# Patient Record
Sex: MALE | Employment: FULL TIME | URBAN - METROPOLITAN AREA
[De-identification: names, ages, dates, MRNs, and addresses within clinical notes are randomized per-mention and may not be internally consistent; named-entity substitution may affect disease eponyms.]

---

## 2021-11-30 ENCOUNTER — TELEPHONE (OUTPATIENT)
Dept: BEHAVIORAL HEALTH | Facility: CLINIC | Age: 46
End: 2021-11-30

## 2022-09-23 ENCOUNTER — TELEPHONE (OUTPATIENT)
Dept: FAMILY MEDICINE | Facility: OTHER | Age: 47
End: 2022-09-23

## 2022-09-23 NOTE — TELEPHONE ENCOUNTER
Please call patient and have him reschedule with a provider closer to him as he lives in [place] and wants to return to work/establish care.       HALEY Fuentes CNP  Questions or concerns please feel free to send me a [employer] message or call me  Phone : [phone]

## 2022-09-26 ENCOUNTER — MYC MEDICAL ADVICE (OUTPATIENT)
Dept: FAMILY MEDICINE | Facility: OTHER | Age: 47
End: 2022-09-26

## 2022-09-26 ENCOUNTER — VIRTUAL VISIT (OUTPATIENT)
Dept: FAMILY MEDICINE | Facility: OTHER | Age: 47
End: 2022-09-26
Payer: COMMERCIAL

## 2022-09-26 DIAGNOSIS — F10.21 ALCOHOL DEPENDENCE IN REMISSION (H): Primary | ICD-10-CM

## 2022-09-26 PROCEDURE — 99202 OFFICE O/P NEW SF 15 MIN: CPT | Mod: TEL | Performed by: NURSE PRACTITIONER

## 2022-09-26 RX ORDER — CITALOPRAM HYDROBROMIDE 40 MG/1
40 TABLET ORAL EVERY MORNING
COMMUNITY
Start: 2022-09-15

## 2022-09-26 NOTE — PROGRESS NOTES
Rigoberto is a 47 year old who is being evaluated via a billable video visit.      How would you like to obtain your AVS? MyChart  If the video visit is dropped, the invitation should be resent by: Text to cell phone: [phone]  Will anyone else be joining your video visit? No        Assessment & Plan     Alcohol dependence in remission (H)  Patient at [facility] (see dates below). Looking to get letter to return to work. Has outpatient plan and plan to establish care with a PCP in his area. Letter completed today and advised he continue with his sobriety.    The patient indicates understanding of these issues and agrees with the plan.      No follow-ups on file.    HALEY Fuentes [facility]   Rigoberto is a 47 year old, presenting for the following health issues:  No chief complaint on file.      HPI   Patient is needing a note to return to work. He was out for three weeks while in [place].   He will not be establishing care today, he will at a clinic closer to him. When he made the appointment, they told him he could do a phone visit to get the letter and then establish at a later date. He needs the letter now so he can go back to work    Patient was there from 09/02/2022- 09/22/2022.     Alcohol dependency program.   Continued to be sober.   No outpatient that would work for his schedule. Not doing IOP for him and doing meetings instead.         Review of Systems         Objective           Vitals:  No vitals were obtained today due to virtual visit.    Physical Exam   GENERAL: Healthy, alert and no distress  NEURO: Cranial nerves grossly intact.  Mentation and speech appropriate for age.  PSYCH: Mentation appears normal, affect normal/bright, judgement and insight intact, normal speech and appearance well-groomed.    No results found for any visits on 09/26/22.    Telephone visit 8 minutes

## 2022-09-26 NOTE — TELEPHONE ENCOUNTER
Reviewed.       HALEY Fuentes CNP  Questions or concerns please feel free to send me a [employer] message or call me  Phone : [phone]

## 2022-09-26 NOTE — LETTER
[address]  [facility] [address]  Phone: [phone]    September 26, 2022        Rigoberto CRESPO Gera  [address] [facility] [address]          To whom it may concern:    RE: Flacozelalem CHERIE Boss    Patient may return to work 09/26/22 with the following:  No working or lifting restrictions    Please contact me for questions or concerns.      Sincerely,        HALEY Fuentes CNP

## 2022-10-20 ENCOUNTER — TELEPHONE (OUTPATIENT)
Dept: NURSING | Facility: CLINIC | Age: 47
End: 2022-10-20

## 2022-10-20 ENCOUNTER — OFFICE VISIT (OUTPATIENT)
Dept: FAMILY MEDICINE | Facility: CLINIC | Age: 47
End: 2022-10-20
Payer: COMMERCIAL

## 2022-10-20 VITALS
RESPIRATION RATE: 16 BRPM | OXYGEN SATURATION: 97 % | HEIGHT: 71 IN | SYSTOLIC BLOOD PRESSURE: 126 MMHG | TEMPERATURE: 97.8 F | BODY MASS INDEX: 42.56 KG/M2 | HEART RATE: 100 BPM | DIASTOLIC BLOOD PRESSURE: 78 MMHG | WEIGHT: 304 LBS

## 2022-10-20 DIAGNOSIS — G89.29 CHRONIC LEFT-SIDED LOW BACK PAIN WITHOUT SCIATICA: ICD-10-CM

## 2022-10-20 DIAGNOSIS — G47.33 OBSTRUCTIVE SLEEP APNEA: ICD-10-CM

## 2022-10-20 DIAGNOSIS — Z00.00 ROUTINE GENERAL MEDICAL EXAMINATION AT A HEALTH CARE FACILITY: Primary | ICD-10-CM

## 2022-10-20 DIAGNOSIS — M54.50 CHRONIC LEFT-SIDED LOW BACK PAIN WITHOUT SCIATICA: ICD-10-CM

## 2022-10-20 DIAGNOSIS — E66.01 MORBID OBESITY (H): ICD-10-CM

## 2022-10-20 DIAGNOSIS — Z23 HIGH PRIORITY FOR 2019-NCOV VACCINE: ICD-10-CM

## 2022-10-20 DIAGNOSIS — E78.5 HYPERLIPIDEMIA, UNSPECIFIED HYPERLIPIDEMIA TYPE: ICD-10-CM

## 2022-10-20 PROBLEM — D3A.012: Status: ACTIVE | Noted: 2022-10-20

## 2022-10-20 LAB
ALBUMIN SERPL-MCNC: 3.9 G/DL (ref 3.4–5)
ALP SERPL-CCNC: 83 U/L (ref 40–150)
ALT SERPL W P-5'-P-CCNC: 35 U/L (ref 0–70)
ANION GAP SERPL CALCULATED.3IONS-SCNC: 8 MMOL/L (ref 3–14)
AST SERPL W P-5'-P-CCNC: 26 U/L (ref 0–45)
BASOPHILS # BLD AUTO: 0 10E3/UL (ref 0–0.2)
BASOPHILS NFR BLD AUTO: 1 %
BILIRUB SERPL-MCNC: 0.4 MG/DL (ref 0.2–1.3)
BUN SERPL-MCNC: 13 MG/DL (ref 7–30)
CALCIUM SERPL-MCNC: 9.8 MG/DL (ref 8.5–10.1)
CHLORIDE BLD-SCNC: 102 MMOL/L (ref 94–109)
CHOLEST SERPL-MCNC: 222 MG/DL
CO2 SERPL-SCNC: 26 MMOL/L (ref 20–32)
CREAT SERPL-MCNC: 0.89 MG/DL (ref 0.66–1.25)
EOSINOPHIL # BLD AUTO: 0.2 10E3/UL (ref 0–0.7)
EOSINOPHIL NFR BLD AUTO: 3 %
ERYTHROCYTE [DISTWIDTH] IN BLOOD BY AUTOMATED COUNT: 13.2 % (ref 10–15)
FASTING STATUS PATIENT QL REPORTED: YES
GFR SERPL CREATININE-BSD FRML MDRD: >90 ML/MIN/1.73M2
GLUCOSE BLD-MCNC: 118 MG/DL (ref 70–99)
HCT VFR BLD AUTO: 41.9 % (ref 40–53)
HDLC SERPL-MCNC: 72 MG/DL
HGB BLD-MCNC: 14.4 G/DL (ref 13.3–17.7)
LDLC SERPL CALC-MCNC: 87 MG/DL
LYMPHOCYTES # BLD AUTO: 2 10E3/UL (ref 0.8–5.3)
LYMPHOCYTES NFR BLD AUTO: 27 %
MCH RBC QN AUTO: 31.5 PG (ref 26.5–33)
MCHC RBC AUTO-ENTMCNC: 34.4 G/DL (ref 31.5–36.5)
MCV RBC AUTO: 92 FL (ref 78–100)
MONOCYTES # BLD AUTO: 0.7 10E3/UL (ref 0–1.3)
MONOCYTES NFR BLD AUTO: 10 %
NEUTROPHILS # BLD AUTO: 4.3 10E3/UL (ref 1.6–8.3)
NEUTROPHILS NFR BLD AUTO: 60 %
NONHDLC SERPL-MCNC: 150 MG/DL
PLATELET # BLD AUTO: 239 10E3/UL (ref 150–450)
POTASSIUM BLD-SCNC: 4.4 MMOL/L (ref 3.4–5.3)
PROT SERPL-MCNC: 8 G/DL (ref 6.8–8.8)
RBC # BLD AUTO: 4.57 10E6/UL (ref 4.4–5.9)
SODIUM SERPL-SCNC: 136 MMOL/L (ref 133–144)
TRIGL SERPL-MCNC: 314 MG/DL
WBC # BLD AUTO: 7.3 10E3/UL (ref 4–11)

## 2022-10-20 PROCEDURE — 99213 OFFICE O/P EST LOW 20 MIN: CPT | Mod: 25 | Performed by: FAMILY MEDICINE

## 2022-10-20 PROCEDURE — 80053 COMPREHEN METABOLIC PANEL: CPT | Performed by: FAMILY MEDICINE

## 2022-10-20 PROCEDURE — 85025 COMPLETE CBC W/AUTO DIFF WBC: CPT | Performed by: FAMILY MEDICINE

## 2022-10-20 PROCEDURE — 0124A COVID-19,PF,PFIZER BOOSTER BIVALENT: CPT | Performed by: FAMILY MEDICINE

## 2022-10-20 PROCEDURE — 90471 IMMUNIZATION ADMIN: CPT | Performed by: FAMILY MEDICINE

## 2022-10-20 PROCEDURE — 80061 LIPID PANEL: CPT | Performed by: FAMILY MEDICINE

## 2022-10-20 PROCEDURE — 91312 COVID-19,PF,PFIZER BOOSTER BIVALENT: CPT | Performed by: FAMILY MEDICINE

## 2022-10-20 PROCEDURE — 36415 COLL VENOUS BLD VENIPUNCTURE: CPT | Performed by: FAMILY MEDICINE

## 2022-10-20 PROCEDURE — 90686 IIV4 VACC NO PRSV 0.5 ML IM: CPT | Performed by: FAMILY MEDICINE

## 2022-10-20 PROCEDURE — 99396 PREV VISIT EST AGE 40-64: CPT | Mod: 25 | Performed by: FAMILY MEDICINE

## 2022-10-20 RX ORDER — CYCLOBENZAPRINE HCL 5 MG
5 TABLET ORAL
COMMUNITY
Start: 2022-09-28 | End: 2022-10-20

## 2022-10-20 RX ORDER — HYDROCODONE BITARTRATE AND ACETAMINOPHEN 5; 325 MG/1; MG/1
TABLET ORAL
Qty: 7 TABLET | Refills: 0 | Status: SHIPPED | OUTPATIENT
Start: 2022-10-20 | End: 2022-10-20

## 2022-10-20 RX ORDER — HYDROCODONE BITARTRATE AND ACETAMINOPHEN 5; 325 MG/1; MG/1
TABLET ORAL
Qty: 10 TABLET | Refills: 0 | Status: SHIPPED | OUTPATIENT
Start: 2022-10-20 | End: 2022-10-20

## 2022-10-20 RX ORDER — ALPRAZOLAM 0.5 MG
0.5 TABLET ORAL PRN
COMMUNITY
Start: 2022-10-19 | End: 2024-07-01 | Stop reason: ALTCHOICE

## 2022-10-20 RX ORDER — HYDROCODONE BITARTRATE AND ACETAMINOPHEN 5; 325 MG/1; MG/1
TABLET ORAL
Qty: 7 TABLET | Refills: 0 | Status: SHIPPED | OUTPATIENT
Start: 2022-10-20 | End: 2023-11-15

## 2022-10-20 ASSESSMENT — ENCOUNTER SYMPTOMS
WEAKNESS: 0
CONSTIPATION: 0
COUGH: 0
PARESTHESIAS: 0
ABDOMINAL PAIN: 1
DIARRHEA: 1
FEVER: 0
MYALGIAS: 1
ARTHRALGIAS: 1
HEADACHES: 0
HEMATURIA: 0
ALLERGIC/IMMUNOLOGIC NEGATIVE: 1
ENDOCRINE NEGATIVE: 1
NERVOUS/ANXIOUS: 1
CHILLS: 0
HEMATOLOGIC/LYMPHATIC NEGATIVE: 1
DYSURIA: 0
SORE THROAT: 0
SHORTNESS OF BREATH: 0
JOINT SWELLING: 0
EYE PAIN: 0
HEMATOCHEZIA: 0
DIZZINESS: 0
HEARTBURN: 1
PALPITATIONS: 0
FREQUENCY: 0
NAUSEA: 0

## 2022-10-20 ASSESSMENT — PAIN SCALES - GENERAL: PAINLEVEL: SEVERE PAIN (6)

## 2022-10-20 NOTE — PROGRESS NOTES
SUBJECTIVE:   CC: Rigoberto is an 47 year old who presents for preventative health visit.   Patient comes in for an annual exam.  History of obstructive sleep apnea he is on a CPAP machine.  Previous history of alcohol use he is fully in remission for over 2 months now.  Chronic low back pain he does get flare up a few times a years, left lower lumbar area.  Has no lower extremity numbness, weakness, lack of sensations.    Patient reports in 2003,hashis appendix removed, at that time, they removed a benign carcinoid tumor.    He has no symptoms of pain, no nausea, no change in his bowel habit, no family history of cancer or colon cancer.    Patient has been advised of split billing requirements and indicates understanding: Yes  Healthy Habits:     Getting at least 3 servings of Calcium per day:  Yes    Bi-annual eye exam:  NO    Dental care twice a year:  Yes    Sleep apnea or symptoms of sleep apnea:  Sleep apnea    Diet:  Regular (no restrictions)    Frequency of exercise:  1 day/week    Duration of exercise:  Greater than 60 minutes    Taking medications regularly:  Yes    Barriers to taking medications:  None    Medication side effects:  None    PHQ-2 Total Score: 0    Additional concerns today:  No      Today's PHQ-2 Score:   PHQ-2 ( 1999 [employer]) 10/20/2022   Q1: Little interest or pleasure in doing things 0   Q2: Feeling down, depressed or hopeless 0   PHQ-2 Score 0   Q1: Little interest or pleasure in doing things Not at all   Q2: Feeling down, depressed or hopeless Not at all   PHQ-2 Score 0       Abuse: Current or Past(Physical, Sexual or Emotional)- No  Do you feel safe in your environment? Yes    Have you ever done Advance Care Planning? (For example, a Health Directive, POLST, or a discussion with a medical provider or your loved ones about your wishes): No, advance care planning information given to patient to review.  Patient declined advance care planning discussion at this time.    Social History      Tobacco Use     Smoking status: Never     Smokeless tobacco: Never   Substance Use Topics     Alcohol use: Not Currently     If you drink alcohol do you typically have >3 drinks per day or >7 drinks per week? No    Alcohol Use 10/20/2022   Prescreen: >3 drinks/day or >7 drinks/week? Not Applicable   No flowsheet data found.    Last PSA: No results found for: PSA    Reviewed orders with patient. Reviewed health maintenance and updated orders accordingly - Yes  Labs reviewed in EPIC  BP Readings from Last 3 Encounters:   10/20/22 126/78    Wt Readings from Last 3 Encounters:   10/20/22 137.9 kg (304 lb)                  Patient Active Problem List   Diagnosis     Anxiety state     Hyperlipidemia     Lumbago     Obesity     LOREZNA on CPAP     Morbid obesity (H)     Benign carcinoid tumor of ileum     Past Surgical History:   Procedure Laterality Date     APPENDECTOMY  2003       Social History     Tobacco Use     Smoking status: Never     Smokeless tobacco: Never   Substance Use Topics     Alcohol use: Not Currently     History reviewed. No pertinent family history.        Reviewed and updated as needed this visit by clinical staff   Tobacco  Allergies  Meds  Problems  Med Hx  Surg Hx  Fam Hx  Soc   Hx        Reviewed and updated as needed this visit by Provider      Problems            Past Medical History:   Diagnosis Date     Benign carcinoid tumor of ileum 2003    s/p resection     Morbid obesity (H)      LORENZA on CPAP       Past Surgical History:   Procedure Laterality Date     APPENDECTOMY  2003     OB History   No obstetric history on file.       Review of Systems   Constitutional: Negative for chills and fever.   HENT: Negative for congestion, ear pain, hearing loss and sore throat.    Eyes: Negative for pain and visual disturbance.   Respiratory: Negative for cough and shortness of breath.    Cardiovascular: Negative for chest pain, palpitations and peripheral edema.   Gastrointestinal: Positive for  "abdominal pain, diarrhea and heartburn. Negative for constipation, hematochezia and nausea.   Endocrine: Negative.    Genitourinary: Negative for dysuria, frequency, genital sores, hematuria and urgency.   Musculoskeletal: Positive for arthralgias and myalgias. Negative for joint swelling.   Skin: Negative for rash.   Allergic/Immunologic: Negative.    Neurological: Negative for dizziness, weakness, headaches and paresthesias.   Hematological: Negative.    Psychiatric/Behavioral: Negative for mood changes. The patient is nervous/anxious.      CONSTITUTIONAL: NEGATIVE for fever, chills, change in weight  INTEGUMENTARY/SKIN: NEGATIVE for worrisome rashes, moles or lesions  EYES: NEGATIVE for vision changes or irritation  ENT: NEGATIVE for ear, mouth and throat problems  RESP: NEGATIVE for significant cough or SOB  CV: NEGATIVE for chest pain, palpitations or peripheral edema  GI: NEGATIVE for nausea, abdominal pain, heartburn, or change in bowel habits   male: negative for dysuria, hematuria, decreased urinary stream, erectile dysfunction, urethral discharge  MUSCULOSKELETAL: NEGATIVE for significant arthralgias or myalgia  NEURO: NEGATIVE for weakness, dizziness or paresthesias  ENDOCRINE: NEGATIVE for temperature intolerance, skin/hair changes  HEME/ALLERGY/IMMUNE: NEGATIVE for bleeding problems  PSYCHIATRIC: NEGATIVE for changes in mood or affect    OBJECTIVE:   /78 (BP Location: Right arm, Patient Position: Chair, Cuff Size: Adult Large)   Pulse 100   Temp 97.8  F (36.6  C) (Tympanic)   Resp 16   Ht 1.803 m (5' 11\")   Wt 137.9 kg (304 lb)   SpO2 97%   BMI 42.40 kg/m      Physical Exam  GENERAL: healthy, alert and no distress  EYES: Eyes grossly normal to inspection, PERRL and conjunctivae and sclerae normal  HENT: ear canals and TM's normal, nose and mouth without ulcers or lesions  NECK: no adenopathy, no asymmetry, masses, or scars and thyroid normal to palpation  RESP: lungs clear to auscultation " - no rales, rhonchi or wheezes  CV: regular rate and rhythm, normal S1 S2, no S3 or S4, no murmur, click or rub, no peripheral edema and peripheral pulses strong  ABDOMEN: soft, nontender, no hepatosplenomegaly, no masses and bowel sounds normal  MS: no gross musculoskeletal defects noted, no edema  Back exam: mild tenderness, left lower paraspinal lumbar region  Full range of motion, full flexion no pain  Mild stiffness with extension  Lower ext strength intact.  Negative for SLR bilaterally.  SKIN: no suspicious lesions or rashes  NEURO: Normal strength and tone, mentation intact and speech normal  PSYCH: mentation appears normal, affect normal/bright    Diagnostic Test Results:  Labs reviewed in Epic  Orders Placed This Encounter   Procedures     REVIEW OF HEALTH MAINTENANCE PROTOCOL ORDERS     INFLUENZA VACCINE IM > 6 MONTHS VALENT IIV4 (AFLURIA/FLUZONE)     COVID-19,PF,PFIZER BOOSTER BIVALENT 12+Yrs     Lipid panel reflex to direct LDL Non-fasting     Comprehensive metabolic panel (BMP + Alb, Alk Phos, ALT, AST, Total. Bili, TP)     CBC with platelets and differential     Colonoscopy Screening  Referral     CBC with platelets and differential       ASSESSMENT/PLAN:   (Z00.00) Routine general medical examination at a health care facility  (primary encounter diagnosis)  Comment: normal exam  Plan: Colonoscopy Screening  Referral, Lipid        panel reflex to direct LDL Non-fasting,         Comprehensive metabolic panel (BMP + Alb, Alk         Phos, ALT, AST, Total. Bili, TP), CBC with         platelets and differential        Routine preventive care discussed.  Advised with diet, exercise, increase physical activity.  Updated his immunizations.  one year annual exam follow-up recommended    (G47.33) Obstructive sleep apnea, CPAP  Comment:   Plan: on CPAP, doing well    (E66.01) Morbid obesity (H)  Comment:   Plan: Discussed diet, exercise, increase physical activity.  Advised patient to avoid  "late meals, try to walk more frequent.  Discussed with patient morbid obesity is a risk for increase diabetes, increased risk for hypertension, increased risk for obstructive sleep apnea, heart disease.    (E78.5) Hyperlipidemia, unspecified hyperlipidemia type  Comment:   Plan: Lipid panel today    (Z23) High priority for 2019-nCoV vaccine  Comment:   Plan: COVID-19,PF,PFIZER BOOSTER BIVALENT 12+Yrs            (M54.50,  G89.29) Chronic left-sided low back pain without sciatica  Comment:   Plan: HYDROcodone-acetaminophen (NORCO) 5-325 MG         tablet        Chronic: Patient to continue with home daily stretching and exercises.  Advised patient to remain active, advised patient strongly to lose weight.  Patient was given 10 tablets of hydrocodone, use as needed for flareup.  With the understanding, this should last for at  least 6 months.    He may use ibuprofen, or any other NSAID as needed.  Flexeril at bedtime as needed.      Patient has been advised of split billing requirements and indicates understanding: Yes      COUNSELING:   Reviewed preventive health counseling, as reflected in patient instructions       Regular exercise       Healthy diet/nutrition       Immunizations    Vaccinated for: Influenza, and COVID booster      Estimated body mass index is 42.4 kg/m  as calculated from the following:    Height as of this encounter: 1.803 m (5' 11\").    Weight as of this encounter: 137.9 kg (304 lb).     Weight management plan: Discussed healthy diet and exercise guidelines    He reports that he has never smoked. He has never used smokeless tobacco.      Counseling Resources:  ATP IV Guidelines  Pooled Cohorts Equation Calculator  FRAX Risk Assessment  ICSI Preventive Guidelines  Dietary Guidelines for Americans, 2010  USDA's MyPlate  ASA Prophylaxis  Lung CA Screening    Madhav Johnson MD  [facility]  "

## 2022-10-20 NOTE — TELEPHONE ENCOUNTER
Routing to PCP.    Rolando, Pharmacist, calling regarding new RX that was sent for Norco.      In order for this RX to be filled it needs to be one of the two options:    1.  Same RX but for CHRONIC back pain.  Law indicates only a 7 day supply can be given for acute back pain. RX to be resent for chronic back pain instead of back pain.    2.  New RX with sig which reads: 1-2 tabs every 6 hours as needed for pain (as this is a 7 day supply).  May dispense more if needed but no more than 7 day supply.    Please resend RX for Norco with one of the two options.    Rolando can be reached at [phone] if needed.      Mingo Villa RN

## 2022-10-20 NOTE — TELEPHONE ENCOUNTER
Bobby. Pharmacist from [facility] ( [phone]) calling about;    New RX for Norco    Can only fill for 7 days unless  Writes an explanation as to why needs 3 months.     Msg routed to Dr. Elizabeth Corey/Care Team to advise pharmacy     Christie Chong RN, Nurse Advisor 2:16 PM 10/20/2022

## 2023-11-15 ENCOUNTER — OFFICE VISIT (OUTPATIENT)
Dept: FAMILY MEDICINE | Facility: CLINIC | Age: 48
End: 2023-11-15
Payer: COMMERCIAL

## 2023-11-15 VITALS
OXYGEN SATURATION: 96 % | TEMPERATURE: 98.5 F | HEART RATE: 83 BPM | RESPIRATION RATE: 18 BRPM | HEIGHT: 71 IN | DIASTOLIC BLOOD PRESSURE: 85 MMHG | WEIGHT: 315 LBS | BODY MASS INDEX: 44.1 KG/M2 | SYSTOLIC BLOOD PRESSURE: 122 MMHG

## 2023-11-15 DIAGNOSIS — E66.01 MORBID OBESITY (H): ICD-10-CM

## 2023-11-15 DIAGNOSIS — G89.29 CHRONIC LEFT-SIDED LOW BACK PAIN WITHOUT SCIATICA: ICD-10-CM

## 2023-11-15 DIAGNOSIS — Z11.4 SCREENING FOR HIV (HUMAN IMMUNODEFICIENCY VIRUS): ICD-10-CM

## 2023-11-15 DIAGNOSIS — Z00.00 ROUTINE GENERAL MEDICAL EXAMINATION AT A HEALTH CARE FACILITY: Primary | ICD-10-CM

## 2023-11-15 DIAGNOSIS — E78.5 HYPERLIPIDEMIA, UNSPECIFIED HYPERLIPIDEMIA TYPE: ICD-10-CM

## 2023-11-15 DIAGNOSIS — Z13.1 SCREENING FOR DIABETES MELLITUS: ICD-10-CM

## 2023-11-15 DIAGNOSIS — G47.33 OSA ON CPAP: ICD-10-CM

## 2023-11-15 DIAGNOSIS — M54.50 CHRONIC LEFT-SIDED LOW BACK PAIN WITHOUT SCIATICA: ICD-10-CM

## 2023-11-15 DIAGNOSIS — Z12.11 SCREEN FOR COLON CANCER: ICD-10-CM

## 2023-11-15 DIAGNOSIS — Z11.59 NEED FOR HEPATITIS C SCREENING TEST: ICD-10-CM

## 2023-11-15 LAB
CHOLEST SERPL-MCNC: 239 MG/DL
CREAT UR-MCNC: 82 MG/DL
FASTING STATUS PATIENT QL REPORTED: YES
GLUCOSE SERPL-MCNC: 96 MG/DL (ref 70–99)
HBA1C MFR BLD: 5.3 % (ref 0–5.6)
HDLC SERPL-MCNC: 82 MG/DL
LDLC SERPL CALC-MCNC: 130 MG/DL
NONHDLC SERPL-MCNC: 157 MG/DL
TRIGL SERPL-MCNC: 136 MG/DL

## 2023-11-15 PROCEDURE — 91320 SARSCV2 VAC 30MCG TRS-SUC IM: CPT | Performed by: PHYSICIAN ASSISTANT

## 2023-11-15 PROCEDURE — 90471 IMMUNIZATION ADMIN: CPT | Performed by: PHYSICIAN ASSISTANT

## 2023-11-15 PROCEDURE — 83036 HEMOGLOBIN GLYCOSYLATED A1C: CPT | Performed by: PHYSICIAN ASSISTANT

## 2023-11-15 PROCEDURE — 36415 COLL VENOUS BLD VENIPUNCTURE: CPT | Performed by: PHYSICIAN ASSISTANT

## 2023-11-15 PROCEDURE — 99214 OFFICE O/P EST MOD 30 MIN: CPT | Mod: 25 | Performed by: PHYSICIAN ASSISTANT

## 2023-11-15 PROCEDURE — 90472 IMMUNIZATION ADMIN EACH ADD: CPT | Performed by: PHYSICIAN ASSISTANT

## 2023-11-15 PROCEDURE — 82947 ASSAY GLUCOSE BLOOD QUANT: CPT | Performed by: PHYSICIAN ASSISTANT

## 2023-11-15 PROCEDURE — 99396 PREV VISIT EST AGE 40-64: CPT | Mod: 25 | Performed by: PHYSICIAN ASSISTANT

## 2023-11-15 PROCEDURE — 90480 ADMN SARSCOV2 VAC 1/ONLY CMP: CPT | Performed by: PHYSICIAN ASSISTANT

## 2023-11-15 PROCEDURE — 86803 HEPATITIS C AB TEST: CPT | Performed by: PHYSICIAN ASSISTANT

## 2023-11-15 PROCEDURE — 90715 TDAP VACCINE 7 YRS/> IM: CPT | Performed by: PHYSICIAN ASSISTANT

## 2023-11-15 PROCEDURE — 87389 HIV-1 AG W/HIV-1&-2 AB AG IA: CPT | Performed by: PHYSICIAN ASSISTANT

## 2023-11-15 PROCEDURE — 90686 IIV4 VACC NO PRSV 0.5 ML IM: CPT | Performed by: PHYSICIAN ASSISTANT

## 2023-11-15 PROCEDURE — G0480 DRUG TEST DEF 1-7 CLASSES: HCPCS | Performed by: PHYSICIAN ASSISTANT

## 2023-11-15 PROCEDURE — 80061 LIPID PANEL: CPT | Performed by: PHYSICIAN ASSISTANT

## 2023-11-15 RX ORDER — BUSPIRONE HYDROCHLORIDE 10 MG/1
TABLET ORAL
COMMUNITY
Start: 2023-04-18 | End: 2023-11-15 | Stop reason: ALTCHOICE

## 2023-11-15 RX ORDER — HYDROCODONE BITARTRATE AND ACETAMINOPHEN 5; 325 MG/1; MG/1
TABLET ORAL
Qty: 10 TABLET | Refills: 0 | Status: SHIPPED | OUTPATIENT
Start: 2023-11-15 | End: 2023-12-22

## 2023-11-15 RX ORDER — GABAPENTIN 400 MG/1
400 CAPSULE ORAL PRN
COMMUNITY

## 2023-11-15 ASSESSMENT — ENCOUNTER SYMPTOMS
DYSURIA: 0
FREQUENCY: 0
COUGH: 0
SHORTNESS OF BREATH: 0
NAUSEA: 1
HEADACHES: 1
FEVER: 0
WEAKNESS: 1
CHILLS: 0
MYALGIAS: 1
SORE THROAT: 0
ABDOMINAL PAIN: 0
NERVOUS/ANXIOUS: 1
DIZZINESS: 0
PARESTHESIAS: 0
HEARTBURN: 1
EYE PAIN: 0
HEMATOCHEZIA: 0
ARTHRALGIAS: 1
PALPITATIONS: 0
HEMATURIA: 0
DIARRHEA: 0
CONSTIPATION: 0
JOINT SWELLING: 0

## 2023-11-15 ASSESSMENT — PAIN SCALES - GENERAL: PAINLEVEL: EXTREME PAIN (8)

## 2023-11-15 NOTE — NURSING NOTE
Prior to immunization administration, verified patients identity using patient s name and date of birth. Please see Immunization Activity for additional information.     Screening Questionnaire for Adult Immunization    Are you sick today?   No   Do you have allergies to medications, food, a vaccine component or latex?   No   Have you ever had a serious reaction after receiving a vaccination?   No   Do you have a long-term health problem with heart, lung, kidney, or metabolic disease (e.g., diabetes), asthma, a blood disorder, no spleen, complement component deficiency, a cochlear implant, or a spinal fluid leak?  Are you on long-term aspirin therapy?   No   Do you have cancer, leukemia, HIV/AIDS, or any other immune system problem?   No   Do you have a parent, brother, or sister with an immune system problem?   No   In the past 3 months, have you taken medications that affect  your immune system, such as prednisone, other steroids, or anticancer drugs; drugs for the treatment of rheumatoid arthritis, Crohn s disease, or psoriasis; or have you had radiation treatments?   No   Have you had a seizure, or a brain or other nervous system problem?   No   During the past year, have you received a transfusion of blood or blood    products, or been given immune (gamma) globulin or antiviral drug?   No   For women: Are you pregnant or is there a chance you could become       pregnant during the next month?   No   Have you received any vaccinations in the past 4 weeks?   No     Immunization questionnaire answers were all negative.      Patient instructed to remain in clinic for 15 minutes afterwards, and to report any adverse reactions.     Screening performed by Fletcher Pradhan on 11/15/2023 at 10:23 AM.

## 2023-11-15 NOTE — PROGRESS NOTES
SUBJECTIVE:   Rigoberto is a 48 year old, presenting for the following:  Physical      Healthy Habits:     Getting at least 3 servings of Calcium per day:  Yes    Bi-annual eye exam:  NO    Dental care twice a year:  NO    Sleep apnea or symptoms of sleep apnea:  Sleep apnea    Diet:  Other    Frequency of exercise:  None    Taking medications regularly:  Yes    Medication side effects:  None    Additional concerns today:  Yes    Additional provider notes :   Chronic back pain - on and off over the last few years. It has been worsening over the last 2 months. Hard to exercise and lose weight because of the pain. Sometimes hard to just do is ADL's specially when it flares up. He has tried pain medication and stretching. He wants to see a specialist for better management           Social History     Tobacco Use    Smoking status: Never     Passive exposure: Never    Smokeless tobacco: Never   Substance Use Topics    Alcohol use: Yes             11/15/2023     9:33 AM   Alcohol Use   Prescreen: >3 drinks/day or >7 drinks/week? Yes   AUDIT SCORE  21         11/15/2023     9:33 AM   AUDIT - Alcohol Use Disorders Identification Test - Reproduced from the World Health Organization Audit 2001 (Second Edition)   1.  How often do you have a drink containing alcohol? 4 or more times a week   2.  How many drinks containing alcohol do you have on a typical day when you are drinking? 3 or 4   3.  How often do you have five or more drinks on one occasion? Weekly   4.  How often during the last year have you found that you were not able to stop drinking once you had started? Weekly   5.  How often during the last year have you failed to do what was normally expected of you because of drinking? Weekly   6.  How often during the last year have you needed a first drink in the morning to get yourself going after a heavy drinking session? Never   7.  How often during the last year have you had a feeling of guilt or remorse after  "drinking? Weekly   8.  How often during the last year have you been unable to remember what happened the night before because of your drinking? Never   9.  Have you or someone else been injured because of your drinking? No   10. Has a relative, friend, doctor or other health care worker been concerned about your drinking or suggested you cut down? Yes, during the last year   TOTAL SCORE 21       Last PSA: No results found for: \"PSA\"    Reviewed orders with patient. Reviewed health maintenance and updated orders accordingly - Yes  Lab work is in process  Labs reviewed in EPIC    Reviewed and updated as needed this visit by clinical staff   Tobacco  Allergies  Meds      Soc Hx        Reviewed and updated as needed this visit by Provider                   Review of Systems   Constitutional:  Negative for chills and fever.   HENT:  Negative for congestion, ear pain, hearing loss and sore throat.    Eyes:  Negative for pain and visual disturbance.   Respiratory:  Negative for cough and shortness of breath.    Cardiovascular:  Negative for chest pain, palpitations and peripheral edema.   Gastrointestinal:  Positive for heartburn and nausea. Negative for abdominal pain, constipation, diarrhea and hematochezia.   Genitourinary:  Negative for dysuria, frequency, genital sores, hematuria and urgency.   Musculoskeletal:  Positive for arthralgias and myalgias. Negative for joint swelling.   Skin:  Negative for rash.   Neurological:  Positive for weakness and headaches. Negative for dizziness and paresthesias.   Psychiatric/Behavioral:  Negative for mood changes. The patient is nervous/anxious.          OBJECTIVE:   Temp 98.5  F (36.9  C) (Oral)   Resp 18   Ht 1.795 m (5' 10.67\")   Wt 147.1 kg (324 lb 6.4 oz)   BMI 45.67 kg/m      Physical Exam  GENERAL: healthy, alert and no distress  EYES: Eyes grossly normal to inspection, PERRL and conjunctivae and sclerae normal  HENT: ear canals and TM's normal, nose and mouth " without ulcers or lesions  NECK: no adenopathy, no asymmetry, masses, or scars and thyroid normal to palpation  RESP: lungs clear to auscultation - no rales, rhonchi or wheezes  CV: regular rate and rhythm, normal S1 S2, no S3 or S4, no murmur, click or rub, no peripheral edema and peripheral pulses strong  ABDOMEN: soft, nontender, no hepatosplenomegaly, no masses and bowel sounds normal  MS: spine exam shows mild tenderness to bilateral lumbar paraspinal muscles, negative straight leg test. Normal strength, decrease ROM on flexion and extension  SKIN: no suspicious lesions or rashes  NEURO: Normal strength and tone, mentation intact and speech normal  PSYCH: mentation appears normal, affect normal/bright    Diagnostic Test Results:  Labs reviewed in Epic    ASSESSMENT/PLAN:   (Z00.00) Routine general medical examination at a health care facility  (primary encounter diagnosis)  Comment: repeat 1 year     (Z12.11) Screen for colon cancer  Plan: Colonoscopy Screening  Referral            (Z11.4) Screening for HIV (human immunodeficiency virus)  Plan: HIV Antigen Antibody Combo            (Z11.59) Need for hepatitis C screening test  Plan: Hepatitis C Screen Reflex to HCV RNA Quant and         Genotype            (M54.50,  G89.29) Chronic left-sided low back pain without sciatica  Comment: Chronic, worsening over the last 2 months. Discussed physical therapy but patient declined. He would prefer to see Specialist. I am okay with this plan. I recommend MRI prior to seeing specialist as they're going to want this to review with patient. Refill on a few Shepherd as needed for severe pain. This should last 6 months until okay for next refill  Plan: [id number] - Urine Drug Confirmation Panel         (Comprehensive), MR Lumbar Spine w/o Contrast,         Orthopedic  Referral,         HYDROcodone-acetaminophen (NORCO) 5-325 MG         tablet            (G47.33) LORENZA on CPAP  Comment: Stable    (E78.5)  Hyperlipidemia, unspecified hyperlipidemia type  Comment: Discussed diet and exercise with patient today to reduce cholesterol. Rechecking labs. May need to consider med managment   Plan: Lipid panel reflex to direct LDL Fasting            (Z13.1) Screening for diabetes mellitus  Plan: Hemoglobin A1c, Glucose            (E66.01) Morbid obesity (H)  Comment: Diet and exercise discussed    Patient has been advised of split billing requirements and indicates understanding: Yes      COUNSELING:   Reviewed preventive health counseling, as reflected in patient instructions       Regular exercise       Healthy diet/nutrition       Consider Hep C screening for all patients one time for ages 18-79 years       Colorectal cancer screening        He reports that he has never smoked. He has never been exposed to tobacco smoke. He has never used smokeless tobacco.            LARON Tapia  [facility]

## 2023-11-16 LAB
HCV AB SERPL QL IA: NONREACTIVE
HIV 1+2 AB+HIV1 P24 AG SERPL QL IA: NONREACTIVE

## 2023-11-17 LAB
A-OH ALPRAZ UR QL CFM: PRESENT
ALPRAZ UR QL CFM: PRESENT
GABAPENTIN UR QL CFM: PRESENT
LORAZEPAM UR QL CFM: PRESENT

## 2023-12-07 ENCOUNTER — TELEPHONE (OUTPATIENT)
Dept: GASTROENTEROLOGY | Facility: CLINIC | Age: 48
End: 2023-12-07
Payer: COMMERCIAL

## 2023-12-07 NOTE — TELEPHONE ENCOUNTER
"Endoscopy Scheduling Screen    Have you had a positive Covid test in the last 14 days?  No    Are you active on MyChart?   Yes    What insurance is in the chart?  Other:  [facility]    Ordering/Referring Provider:     YVONNE CARTER      (If ordering provider performs procedure, schedule with ordering provider unless otherwise instructed. )    BMI: Estimated body mass index is 45.67 kg/m  as calculated from the following:    Height as of 11/15/23: 1.795 m (5' 10.67\").    Weight as of 11/15/23: 147.1 kg (324 lb 6.4 oz).     Sedation Ordered  moderate sedation.   If patient BMI > 50 do not schedule in [facility].    If patient BMI > 45 do not schedule at [facility].    Are you taking methadone or Suboxone?  No    Are you taking any prescription medications for pain 3 or more times per week?   No    Do you have a history of malignant hyperthermia or adverse reaction to anesthesia?  No    (Females) Are you currently pregnant?        Have you been diagnosed or told you have pulmonary hypertension?   No    Do you have an LVAD?  No    Have you been told you have moderate to severe sleep apnea?  Yes (RN Review required for scheduling unless scheduling in Hospital.)    Have you been told you have COPD, asthma, or any other lung disease?  No    Do you have any heart conditions?  No     Have you ever had an organ transplant?   No    Have you ever had or are you awaiting a heart or lung transplant?   No    Have you had a stroke or transient ischemic attack (TIA aka \"mini stroke\" in the last 6 months?   No    Have you been diagnosed with or been told you have cirrhosis of the liver?   No    Are you currently on dialysis?   No    Do you need assistance transferring?   No    BMI: Estimated body mass index is 45.67 kg/m  as calculated from the following:    Height as of 11/15/23: 1.795 m (5' 10.67\").    Weight as of 11/15/23: 147.1 kg (324 lb 6.4 oz).     Is patients BMI > 40 and scheduling location [facility]?  Yes (If MAC sedation is ordered, schedule " PAC eval)    Do you take an injectable medication for weight loss or diabetes (excluding insulin)?  No    Do you take the medication Naltrexone?  No    Do you take blood thinners?  No       Prep   Are you currently on dialysis or do you have chronic kidney disease?  No    Do you have a diagnosis of diabetes?  No    Do you have a diagnosis of cystic fibrosis (CF)?  No    On a regular basis do you go 3 -5 days between bowel movements?  No    BMI > 40?  Yes (Extended Prep)    Preferred Pharmacy:    [facility]/pharmacy #[id number] - [facility] [address] AT CORNER OF [address] [place], [place] [address] [facility] [address]  Phone: [phone] Fax: [phone]      Final Scheduling Details   Colonoscopy prep sent?  Golytely Extended Prep    Procedure scheduled  Colonoscopy    Surgeon:  NIKIA     Date of procedure:  3/1     Pre-OP / PAC:   Yes - PAC clinic evaluation scheduled.    Location  [facility] - Per order.    Sedation   Moderate Sedation - Per order.      Patient Reminders:   You will receive a call from a Nurse to review instructions and health history.  This assessment must be completed prior to your procedure.  Failure to complete the Nurse assessment may result in the procedure being cancelled.      On the day of your procedure, please designate an adult(s) who can drive you home stay with you for the next 24 hours. The medicines used in the exam will make you sleepy. You will not be able to drive.      You cannot take public transportation, ride share services, or non-medical taxi service without a responsible caregiver.  Medical transport services are allowed with the requirement that a responsible caregiver will receive you at your destination.  We require that drivers and caregivers are confirmed prior to your procedure.

## 2023-12-08 NOTE — TELEPHONE ENCOUNTER
FUTURE VISIT INFORMATION      SURGERY INFORMATION:  Date: 3/1/24  Location:  [facility]  Surgeon:  Frederick Sanchez MD   Anesthesia Type:    Moderate Sedation     Procedure: Colonoscopy   RECORDS REQUESTED FROM:       Primary Care Provider: [facility]    Pertinent Medical History: LORENZA

## 2023-12-17 ENCOUNTER — MYC REFILL (OUTPATIENT)
Dept: FAMILY MEDICINE | Facility: CLINIC | Age: 48
End: 2023-12-17
Payer: COMMERCIAL

## 2023-12-17 DIAGNOSIS — M54.50 CHRONIC LEFT-SIDED LOW BACK PAIN WITHOUT SCIATICA: ICD-10-CM

## 2023-12-17 DIAGNOSIS — G89.29 CHRONIC LEFT-SIDED LOW BACK PAIN WITHOUT SCIATICA: ICD-10-CM

## 2023-12-18 ENCOUNTER — TELEPHONE (OUTPATIENT)
Dept: FAMILY MEDICINE | Facility: CLINIC | Age: 48
End: 2023-12-18
Payer: COMMERCIAL

## 2023-12-18 DIAGNOSIS — M54.50 CHRONIC LEFT-SIDED LOW BACK PAIN WITHOUT SCIATICA: ICD-10-CM

## 2023-12-18 DIAGNOSIS — G89.29 CHRONIC LEFT-SIDED LOW BACK PAIN WITHOUT SCIATICA: ICD-10-CM

## 2023-12-18 RX ORDER — HYDROCODONE BITARTRATE AND ACETAMINOPHEN 5; 325 MG/1; MG/1
TABLET ORAL
Qty: 10 TABLET | Refills: 0 | OUTPATIENT
Start: 2023-12-18

## 2023-12-18 NOTE — TELEPHONE ENCOUNTER
"Pt calling    He was wondering if he could get a refil on his narcotic      He is exercising and swimming, which is going well.  He reports getting a new chair and that has caused his back to \"go out\"- this has been going on for a few days.  It is especially painful at night and causes him to not be able to sleep. He is using ibuprofen as well without relief. He has not scheduled with any specialist yet      11/15  (M54.50,  G89.29) Chronic left-sided low back pain without sciatica  Comment: Chronic, worsening over the last 2 months. Discussed physical therapy but patient declined. He would prefer to see Specialist. I am okay with this plan. I recommend MRI prior to seeing specialist as they're going to want this to review with patient. Refill on a few [place] as needed for severe pain. This should last 6 months until okay for next refill  Plan: [id number] - Urine Drug Confirmation Panel         (Comprehensive), MR Lumbar Spine w/o Contrast,         Orthopedic  Referral,         HYDROcodone-acetaminophen (NORCO) 5-325 MG     He was taking 1 per day when the pain got very bad.  He thought that this would last him 6 months but then he states hurt his back again.      Marcella, RN    Triage Nurse  [facility]      "

## 2023-12-18 NOTE — TELEPHONE ENCOUNTER
If he would like to continue with more narcotic use then he would need to be seen by a pain clinic to prescribe and monitor medication.

## 2023-12-18 NOTE — TELEPHONE ENCOUNTER
Patient returned call. Relayed message from Pamela Jacobs. Asked patient if he would like a referral from her to pain clinic. Patient declined stating that this won't help him right now because it will probably take some to get an appointment with them. Notified him that if he changes his mind about the referral to let us know    Janice Weldon RN   [facility]

## 2023-12-22 RX ORDER — METHOCARBAMOL 500 MG/1
500 TABLET, FILM COATED ORAL 3 TIMES DAILY PRN
Qty: 30 TABLET | Refills: 0 | Status: SHIPPED | OUTPATIENT
Start: 2023-12-22 | End: 2024-02-09

## 2023-12-22 RX ORDER — LIDOCAINE 4 G/G
1 PATCH TOPICAL EVERY 24 HOURS
Qty: 10 PATCH | Refills: 0 | Status: SHIPPED | OUTPATIENT
Start: 2023-12-22 | End: 2024-02-09

## 2023-12-22 RX ORDER — HYDROCODONE BITARTRATE AND ACETAMINOPHEN 5; 325 MG/1; MG/1
TABLET ORAL
Qty: 10 TABLET | Refills: 0 | Status: SHIPPED | OUTPATIENT
Start: 2023-12-22 | End: 2024-02-09

## 2023-12-22 NOTE — TELEPHONE ENCOUNTER
"Routing to Pamela and covering providers.     Patient calling     He is wanting narcotic med as his back pain is worsening    RN went over provider's note below. RN asked if he wanted pain clinic referral and patient said: \"only if I can get in today\"  RN said that the provider needs to order the referral and then they need to schedule him an appointment and that would not be today, but after the weekend.    Patient got really frustrated with RN, so RN advised ED or UC today if his back pain is severe. Patient said \"no I have holiday plans. just have Pamela call me\". RN advised that a note can be sent to Pamela and covering providers, but RN cannot guarantee that it will be addressed before the weekend. Patient was very frustrated with RN. RN asked if he needed anything else, and patient said no. Phone call ended    Patient came into clinic about 15 minutes later and asked for update on the narcotic med.   RN had not yet had the chance to send this encounter yet in that time.  let patient know that message will be sent, but there is no guarantee that this will get addressed this weekend.    Lucita Marinelli RN    "

## 2023-12-22 NOTE — TELEPHONE ENCOUNTER
Patient calling back to check status of request.  Reviewed provider's message to patient and also explained that ABNER Landrum already sent an urgent message to provider again with his request below.    He stated that he is not a drug seeker and uses the norco sparingly.  He would rather take something that he knows he can tolerate than to try something new.  He is asking if provider can send in a small supply, 10 tabs, to get him through the holiday  (Per note below, he is also needing the muscle relaxant and lidocaine patch)    Please call patient back once provider responds    Arjun Villavicencio RN  [facility]

## 2023-12-22 NOTE — TELEPHONE ENCOUNTER
I have sent in the lidocaine patch, muscle relaxant and Norco. If he wants any refills on his Norco before 6 months from now for his back pain he will need get it from pain clinic or spine specialist. I will not refill it again early  Thank you,  Pamela Ruth PA-C

## 2023-12-22 NOTE — TELEPHONE ENCOUNTER
Routing to Pamela LEVINE called patient     He would like the muscle relaxant and the lidocaine patch.    Patient is very persistent about getting norco and RN kept reiterating provider's message, but he wanted me to make sure that was included in message to provider.    Lucita Marinelli RN

## 2023-12-22 NOTE — TELEPHONE ENCOUNTER
I reviewed this telephone encounter with patient as well as my last visit. I understand his pain is worsening but I don't recommend any more narcotic use. I can however offer alternative pain medication such as Naproxen, muscle relaxant, or topical lidocaine patches? OTC alternating 400-600 mg of ibuprofen and 500 mg of Tylenol is also an option. Please let me know and I can send in something.     Thank you,  Pamela Ruth PA-C

## 2023-12-22 NOTE — TELEPHONE ENCOUNTER
Patient called back, and advised to UC, as there are not any openings today in the clinic.    MRI scheduled on 1/6/24, and spine is scheduled at 1/8/23.    He refuses UC, as he does not want to set there for 5 hours.    Advised Pamela LARRY is not in clinic today.    He stated that she would like a covering provider to address this.    Routed to provider, covering provider, and triage.    Ban TAMN RN  Triage Nurse  [facility]

## 2023-12-22 NOTE — TELEPHONE ENCOUNTER
RN called patient/family and relayed provider's message. Patient/family verbalized understanding.     Lucita Marinelli RN, BSN  [facility]

## 2024-01-06 ENCOUNTER — HOSPITAL ENCOUNTER (OUTPATIENT)
Dept: MRI IMAGING | Facility: HOSPITAL | Age: 49
Discharge: HOME | End: 2024-01-06
Attending: PHYSICIAN ASSISTANT | Admitting: PHYSICIAN ASSISTANT
Payer: COMMERCIAL

## 2024-01-06 DIAGNOSIS — M54.50 CHRONIC LEFT-SIDED LOW BACK PAIN WITHOUT SCIATICA: ICD-10-CM

## 2024-01-06 DIAGNOSIS — G89.29 CHRONIC LEFT-SIDED LOW BACK PAIN WITHOUT SCIATICA: ICD-10-CM

## 2024-01-06 PROCEDURE — 72148 MRI LUMBAR SPINE W/O DYE: CPT

## 2024-01-08 ENCOUNTER — OFFICE VISIT (OUTPATIENT)
Dept: PHYSICAL MEDICINE AND REHAB | Facility: CLINIC | Age: 49
End: 2024-01-08
Attending: PHYSICIAN ASSISTANT
Payer: COMMERCIAL

## 2024-01-08 VITALS
SYSTOLIC BLOOD PRESSURE: 130 MMHG | HEIGHT: 71 IN | DIASTOLIC BLOOD PRESSURE: 81 MMHG | BODY MASS INDEX: 44.1 KG/M2 | HEART RATE: 76 BPM | WEIGHT: 315 LBS

## 2024-01-08 DIAGNOSIS — M54.50 CHRONIC LEFT-SIDED LOW BACK PAIN WITHOUT SCIATICA: ICD-10-CM

## 2024-01-08 DIAGNOSIS — G89.29 CHRONIC LEFT-SIDED LOW BACK PAIN WITHOUT SCIATICA: ICD-10-CM

## 2024-01-08 PROCEDURE — 99203 OFFICE O/P NEW LOW 30 MIN: CPT | Performed by: NURSE PRACTITIONER

## 2024-01-08 ASSESSMENT — PAIN SCALES - GENERAL: PAINLEVEL: NO PAIN (1)

## 2024-01-08 NOTE — LETTER
1/8/2024         RE: Rigoberto Boss  [address] [address]        Dear Colleague,    Thank you for referring your patient, Rigoberto Boss, to the [facility]. Please see a copy of my visit note below.    ASSESSMENT: Rigoberto Boss is a 48 year old male who presents for consultation at the request of PCP No Ref-Primary, Physician, who presents today for new patient evaluation of:    -low back pain    Patient is neurologically intact on exam. No myelopathic or red flag symptoms. Recommended PT and prn NSAIDS, consider SI belt. If pain flares up, encouraged him to contact us we could consider rx NSAID vs brief course of steroids. We briefly discussed SI vs lumbar epidural steroid injections if PT/meds are not helpful.         No data to display                     Diagnoses and all orders for this visit:  Chronic left-sided low back pain without sciatica  -     Orthopedic  Referral  -     Physical Therapy Referral; Future      PLAN:  Reviewed spine anatomy and disease process. Discussed diagnosis and treatment options with the patient today. A shared decision making model was used.  The patient's values and choices were respected. The following represents what was discussed and decided upon by the provider and the patient.      -DIAGNOSTIC TESTS:  Images were personally reviewed and interpreted and explained to patient today using a spine model.   --did not order any additional imaging today, could consider SI xr in future for treatment planning    -PHYSICAL THERAPY:    -- I ordered PT today  Discussed the importance of core strengthening, ROM, stretching exercises with the patient and how each of these entities is important in decreasing pain.  Explained to the patient that the purpose of physical therapy is to teach the patient a home exercise program.  These exercises need to be performed every day in order to decrease pain and prevent  future occurrences of pain.        -MEDICATIONS:    --ongoing prn ibuprofen or aleve use otc  -discussed possible rx nsaid vs steroid course if future flare-up despite PT  Discussed multiple medication options today with patient. Discussed risks, side effects, and proper use of medications. Patient verbalized understanding.    -INTERVENTIONS:    Discussed the role of injections with patient today. Patient would be a good candidate in the future for either epidural steroid injections or medial branch blocks or SI injections if indicated based on symptoms and supported by imaging results.    -PATIENT EDUCATION:  Total time of 40 minutes, on the day of service, spent with the patient, reviewing the chart, placing orders, and documenting.   -Today we also discussed the pros and cons of the current treatment plan.    -FOLLOW-UP:   PRN    Advised patient to call the Spine Center if symptoms worsen, new numbness or weakness develops in the legs, or if they develop new or worsening problems controlling bladder or bowel function.   ______________________________________________________________________    SUBJECTIVE:    HPI:  Rigoberto Boss  Is a 48 year old male hx LORENZA, anxiety, hyperlipidemia, benign carcinoid tumor of ileum who presents today for new patient evaluation of low back pain     Pain started years ago. Bilateral lower back pain. He has experienced flares of pain which come and go over the years. Prolonged sitting in a bad chair, sneezing has made his back go out in the past. Sometimes it's so bad that he gets stuck on the floor, other times he has soreness with bending and twisting. Typically his pain is more left sided, but this most recent time it was more right sided.    Describes the pain as aching, shooting, causing him to tense up. Worse 8/10 with bending over, getting in and out of the car. Improves with rest, taking ibuprofen, ice, and resting. He tries to take one day completely off of his feet, and  sometimes a second day as well. Severe pain typically lasts for 4 days,then becomes manageable after that and goes away, but does on a chronic basis get worse with bending forward and loading the  and shoveling snow, and prolonged sitting and then getting up and getting into a car. Pain today is 1/10.    He saw his PCP 11/15/23 for his annual physical and was referred for a lumbar MRI and to spine for further evaluation.    No leg pain, numbness, tingling, leg weakness, or changes in bowel or bladder function.    He has typically taken ibuprofen during flares. Has tried aleve as well.  He is taking gabapentin 400mg TID for anxiety and he is not sure if it has helped with his back pain.   He has taken methocarbamol, during last flare, not sure if it helped. It was already getting better when he started taking it, and he maybe took it for 1 day.   He tried using lidocaine patches one time in the past, which did not help. Did not seem to penetrate the skin layer and pain felt deeper.  He has norco on hand to take prn for the first couple days of severe pain, however only takes this during flares and not regular basis.    He has not done PT, chiropractic care, injections, or previous spine surgeries.      -Treatment to Date:     -Medications:  Ibuprofen  Aleve  Lidocaine patches  Gabapentin (for anxiety)  methocarbamol      Current Outpatient Medications   Medication     ALPRAZolam (XANAX) 0.5 MG tablet     citalopram (CELEXA) 40 MG tablet     gabapentin (NEURONTIN) 400 MG capsule     HYDROcodone-acetaminophen (NORCO) 5-325 MG tablet     Lidocaine (LIDOCARE) 4 % Patch     methocarbamol (ROBAXIN) 500 MG tablet     No current facility-administered medications for this visit.       No Known Allergies    Past Medical History:   Diagnosis Date     Benign carcinoid tumor of ileum (H28) 2003    s/p resection     Morbid obesity (H)      LORENZA on CPAP         Patient Active Problem List   Diagnosis     Anxiety state      "Hyperlipidemia     Lumbago     Obesity     LORENZA on CPAP     Morbid obesity (H)     Benign carcinoid tumor of ileum (H28)       Past Surgical History:   Procedure Laterality Date     APPENDECTOMY  2003       No family history on file.    Reviewed past medical, surgical, and family history with patient found on new patient intake packet located in EMR Media tab.     SOCIAL HX: nonsmoker, alcohol use, previous heavy drinking history, no rec drug use    ROS: positive for weight gain, headache, hoarseness, difficulty swallowing, cough, anxiety. Specifically negative for bowel/bladder dysfunction, balance changes, headache, dizziness, foot drop, fevers, chills, appetite changes, nausea/vomiting, unexplained weight loss. Otherwise 13 systems reviewed are negative. Please see the patient's intake questionnaire from today for details.    OBJECTIVE:  /81   Pulse 76   Ht 5' 10.67\" (1.795 m)   Wt 324 lb (147 kg)   BMI 45.61 kg/m      PHYSICAL EXAMINATION:    --CONSTITUTIONAL:  Vital signs as above.  No acute distress.  The patient is well nourished and well groomed.  --PSYCHIATRIC:  Appropriate mood and affect. The patient is awake, alert, oriented to person, place, time and answering questions appropriately with clear speech.    --SKIN:  Skin over the face, bilateral lower extremities, and posterior torso is clean, dry, intact without rashes.    --RESPIRATORY: Normal rhythm and effort. No abnormal accessory muscle breathing patterns noted.   --ABDOMINAL:  Non-distended.    --GROSS MOTOR: Gait is non-antalgic. Easily arises from a seated position. Toe walking and heel walking are normal without significant difficulty.      --LOWER EXTREMITY MOTOR TESTING:  Hip flexion: right 5/5, left 5/5  Hip abduction: right 5/5, left 5/5  Hip adduction: right 5/5, left 5/5   Quads: right 5/5, left 5/5  Hamstrings: right 5/5, left 5/5  Dorsiflexion: right 5/5, left 5/5  Plantar flexion: right 5/5, left 5/5    Great toe MTP " extension/EHL: right 5/5, left 5/5    --NEUROLOGICAL:  1/4 patellar and achilles reflexes bilaterally. Sensation to light touch is intact throughout both lower extremities.No clonus.    --MUSCULOSKELETAL: Lumbar spine inspection reveals no evidence of deformity. Range of motion is not limited in lumbar extension, lateral rotation, or sidebending. Mildly limited with pain in flexion. No point tenderness to palpation lumbar spine. No paraspinal musculature tenderness.     Straight leg raising is negative.    --HIPS: Full range of motion bilaterally. Negative AMELIA and Negative FADIR bilaterally.     --SACROILIAC JOINT: Gaenslen's Test was positive on left.  Thigh thrust was negative. Sacroiliac Joint Compression Test was negative. One finger point test was positive on left. Negative SI joint tenderness to palpation bilaterally.    --VASCULAR:  Bilateral lower extremities are warm without any discoloration.  There is no pitting edema of the bilateral lower extremities.    RESULTS:   Prior medical records from [facility]where were reviewed today.    Imaging: Spine imaging was personally reviewed and interpreted today. The images were shown to the patient and the findings were explained using a spine model.      MR Lumbar Spine w/o Contrast    Result Date: 1/7/2024  EXAM: MR LUMBAR SPINE W/O CONTRAST LOCATION: [facility] DATE: 1/6/2024 INDICATION:  Chronic left-sided low back pain without sciatica, Chronic left-sided low back pain without sciatica COMPARISON: None. TECHNIQUE: Routine Lumbar Spine MRI without IV contrast. FINDINGS: Nomenclature is based on 5 lumbar type vertebral bodies. Retrolisthesis L1 on L2 measuring 3 mm. Vertebral body heights are maintained. No acute anterior compression deformity or pathologic marrow signal abnormality. Normal distal spinal cord and cauda equina with conus medullaris at the L1 level. No extraspinal abnormality. Unremarkable visualized  bony pelvis. T12-L1: Normal disc height and signal. No herniation. Normal facets. No spinal canal or neural foraminal stenosis. L1-L2: Mildly desiccated disc without disc height loss. Small disc bulge containing dorsal annular fissure with superimposed left central disc extrusion. Findings produce mild acquired and congenital spinal canal stenosis and partial effacement of the left lateral recess. Disc extrusion abuts and displaces the traversing left L2 nerve root. No neural foraminal stenosis. L2-L3: Normal disc height and signal. No herniation. Normal facets. No spinal canal or neural foraminal stenosis. L3-L4: Normal disc height and signal. Minimal disc bulge, contacts the L4 nerve roots without mass effect. Normal facets. No spinal canal or neural foraminal stenosis. L4-L5: Normal disc height and disc signal. A small right central disc extrusion is present containing annular fissure. No significant spinal canal stenosis. There is abutment of the right L5 nerve root without mass effect. No significant neural foraminal  stenosis. Facet degeneration at this level is mild. L5-S1: Normal disc height and signal. No herniation. Moderate right-sided facet degenerative change.. No spinal canal or neural foraminal stenosis.     IMPRESSION: 1.  L1-L2 disc bulge containing dorsal annular fissure with superimposed left central disc extrusion produce mild acquired and congenital spinal canal stenosis. Disc extrusion abuts and displaces the left L2 nerve root in the lateral recess. 2.  L4-L5 right central disc extrusion containing annular fissure, contacts the right L5 nerve root without mass effect.          Eli DONOVANP-C  [facility]  O. [phone]             Again, thank you for allowing me to participate in the care of your patient.        Sincerely,        Eli Silverman, HALEY CNP

## 2024-01-08 NOTE — PROGRESS NOTES
ASSESSMENT: Rigoberto Boss is a 48 year old male who presents for consultation at the request of PCP No Ref-Primary, Physician, who presents today for new patient evaluation of:    -low back pain    Patient is neurologically intact on exam. No myelopathic or red flag symptoms. Recommended PT and prn NSAIDS, consider SI belt. If pain flares up, encouraged him to contact us we could consider rx NSAID vs brief course of steroids. We briefly discussed SI vs lumbar epidural steroid injections if PT/meds are not helpful.         No data to display                     Diagnoses and all orders for this visit:  Chronic left-sided low back pain without sciatica  -     Orthopedic  Referral  -     Physical Therapy Referral; Future      PLAN:  Reviewed spine anatomy and disease process. Discussed diagnosis and treatment options with the patient today. A shared decision making model was used.  The patient's values and choices were respected. The following represents what was discussed and decided upon by the provider and the patient.      -DIAGNOSTIC TESTS:  Images were personally reviewed and interpreted and explained to patient today using a spine model.   --did not order any additional imaging today, could consider SI xr in future for treatment planning    -PHYSICAL THERAPY:    -- I ordered PT today  Discussed the importance of core strengthening, ROM, stretching exercises with the patient and how each of these entities is important in decreasing pain.  Explained to the patient that the purpose of physical therapy is to teach the patient a home exercise program.  These exercises need to be performed every day in order to decrease pain and prevent future occurrences of pain.        -MEDICATIONS:    --ongoing prn ibuprofen or aleve use otc  -discussed possible rx nsaid vs steroid course if future flare-up despite PT  Discussed multiple medication options today with patient. Discussed risks, side effects, and proper  use of medications. Patient verbalized understanding.    -INTERVENTIONS:    Discussed the role of injections with patient today. Patient would be a good candidate in the future for either epidural steroid injections or medial branch blocks or SI injections if indicated based on symptoms and supported by imaging results.    -PATIENT EDUCATION:  Total time of 40 minutes, on the day of service, spent with the patient, reviewing the chart, placing orders, and documenting.   -Today we also discussed the pros and cons of the current treatment plan.    -FOLLOW-UP:   PRN    Advised patient to call the Spine Center if symptoms worsen, new numbness or weakness develops in the legs, or if they develop new or worsening problems controlling bladder or bowel function.   ______________________________________________________________________    SUBJECTIVE:    HPI:  Rigoberto Boss  Is a 48 year old male hx LORENZA, anxiety, hyperlipidemia, benign carcinoid tumor of ileum who presents today for new patient evaluation of low back pain     Pain started years ago. Bilateral lower back pain. He has experienced flares of pain which come and go over the years. Prolonged sitting in a bad chair, sneezing has made his back go out in the past. Sometimes it's so bad that he gets stuck on the floor, other times he has soreness with bending and twisting. Typically his pain is more left sided, but this most recent time it was more right sided.    Describes the pain as aching, shooting, causing him to tense up. Worse 8/10 with bending over, getting in and out of the car. Improves with rest, taking ibuprofen, ice, and resting. He tries to take one day completely off of his feet, and sometimes a second day as well. Severe pain typically lasts for 4 days,then becomes manageable after that and goes away, but does on a chronic basis get worse with bending forward and loading the  and shoveling snow, and prolonged sitting and then getting up  and getting into a car. Pain today is 1/10.    He saw his PCP 11/15/23 for his annual physical and was referred for a lumbar MRI and to spine for further evaluation.    No leg pain, numbness, tingling, leg weakness, or changes in bowel or bladder function.    He has typically taken ibuprofen during flares. Has tried aleve as well.  He is taking gabapentin 400mg TID for anxiety and he is not sure if it has helped with his back pain.   He has taken methocarbamol, during last flare, not sure if it helped. It was already getting better when he started taking it, and he maybe took it for 1 day.   He tried using lidocaine patches one time in the past, which did not help. Did not seem to penetrate the skin layer and pain felt deeper.  He has norco on hand to take prn for the first couple days of severe pain, however only takes this during flares and not regular basis.    He has not done PT, chiropractic care, injections, or previous spine surgeries.      -Treatment to Date:     -Medications:  Ibuprofen  Aleve  Lidocaine patches  Gabapentin (for anxiety)  methocarbamol      Current Outpatient Medications   Medication    ALPRAZolam (XANAX) 0.5 MG tablet    citalopram (CELEXA) 40 MG tablet    gabapentin (NEURONTIN) 400 MG capsule    HYDROcodone-acetaminophen (NORCO) 5-325 MG tablet    Lidocaine (LIDOCARE) 4 % Patch    methocarbamol (ROBAXIN) 500 MG tablet     No current facility-administered medications for this visit.       No Known Allergies    Past Medical History:   Diagnosis Date    Benign carcinoid tumor of ileum (H28) 2003    s/p resection    Morbid obesity (H)     LORENZA on CPAP         Patient Active Problem List   Diagnosis    Anxiety state    Hyperlipidemia    Lumbago    Obesity    LORENZA on CPAP    Morbid obesity (H)    Benign carcinoid tumor of ileum (H28)       Past Surgical History:   Procedure Laterality Date    APPENDECTOMY  2003       No family history on file.    Reviewed past medical, surgical, and family  "history with patient found on new patient intake packet located in EMR [employer] tab.     SOCIAL HX: nonsmoker, alcohol use, previous heavy drinking history, no rec drug use    ROS: positive for weight gain, headache, hoarseness, difficulty swallowing, cough, anxiety. Specifically negative for bowel/bladder dysfunction, balance changes, headache, dizziness, foot drop, fevers, chills, appetite changes, nausea/vomiting, unexplained weight loss. Otherwise 13 systems reviewed are negative. Please see the patient's intake questionnaire from today for details.    OBJECTIVE:  /81   Pulse 76   Ht 5' 10.67\" (1.795 m)   Wt 324 lb (147 kg)   BMI 45.61 kg/m      PHYSICAL EXAMINATION:    --CONSTITUTIONAL:  Vital signs as above.  No acute distress.  The patient is well nourished and well groomed.  --PSYCHIATRIC:  Appropriate mood and affect. The patient is awake, alert, oriented to person, place, time and answering questions appropriately with clear speech.    --SKIN:  Skin over the face, bilateral lower extremities, and posterior torso is clean, dry, intact without rashes.    --RESPIRATORY: Normal rhythm and effort. No abnormal accessory muscle breathing patterns noted.   --ABDOMINAL:  Non-distended.    --GROSS MOTOR: Gait is non-antalgic. Easily arises from a seated position. Toe walking and heel walking are normal without significant difficulty.      --LOWER EXTREMITY MOTOR TESTING:  Hip flexion: right 5/5, left 5/5  Hip abduction: right 5/5, left 5/5  Hip adduction: right 5/5, left 5/5   Quads: right 5/5, left 5/5  Hamstrings: right 5/5, left 5/5  Dorsiflexion: right 5/5, left 5/5  Plantar flexion: right 5/5, left 5/5    Great toe MTP extension/EHL: right 5/5, left 5/5    --NEUROLOGICAL:  1/4 patellar and achilles reflexes bilaterally. Sensation to light touch is intact throughout both lower extremities.No clonus.    --MUSCULOSKELETAL: Lumbar spine inspection reveals no evidence of deformity. Range of motion is not " limited in lumbar extension, lateral rotation, or sidebending. Mildly limited with pain in flexion. No point tenderness to palpation lumbar spine. No paraspinal musculature tenderness.     Straight leg raising is negative.    --HIPS: Full range of motion bilaterally. Negative AMELIA and Negative FADIR bilaterally.     --SACROILIAC JOINT: Gaenslen's Test was positive on left.  Thigh thrust was negative. Sacroiliac Joint Compression Test was negative. One finger point test was positive on left. Negative SI joint tenderness to palpation bilaterally.    --VASCULAR:  Bilateral lower extremities are warm without any discoloration.  There is no pitting edema of the bilateral lower extremities.    RESULTS:   Prior medical records from [facility] and Care Everywhere were reviewed today.    Imaging: Spine imaging was personally reviewed and interpreted today. The images were shown to the patient and the findings were explained using a spine model.      MR Lumbar Spine w/o Contrast    Result Date: 1/7/2024  EXAM: MR LUMBAR SPINE W/O CONTRAST LOCATION: [facility] DATE: 1/6/2024 INDICATION:  Chronic left-sided low back pain without sciatica, Chronic left-sided low back pain without sciatica COMPARISON: None. TECHNIQUE: Routine Lumbar Spine MRI without IV contrast. FINDINGS: Nomenclature is based on 5 lumbar type vertebral bodies. Retrolisthesis L1 on L2 measuring 3 mm. Vertebral body heights are maintained. No acute anterior compression deformity or pathologic marrow signal abnormality. Normal distal spinal cord and cauda equina with conus medullaris at the L1 level. No extraspinal abnormality. Unremarkable visualized bony pelvis. T12-L1: Normal disc height and signal. No herniation. Normal facets. No spinal canal or neural foraminal stenosis. L1-L2: Mildly desiccated disc without disc height loss. Small disc bulge containing dorsal annular fissure with superimposed left central disc extrusion.  Findings produce mild acquired and congenital spinal canal stenosis and partial effacement of the left lateral recess. Disc extrusion abuts and displaces the traversing left L2 nerve root. No neural foraminal stenosis. L2-L3: Normal disc height and signal. No herniation. Normal facets. No spinal canal or neural foraminal stenosis. L3-L4: Normal disc height and signal. Minimal disc bulge, contacts the L4 nerve roots without mass effect. Normal facets. No spinal canal or neural foraminal stenosis. L4-L5: Normal disc height and disc signal. A small right central disc extrusion is present containing annular fissure. No significant spinal canal stenosis. There is abutment of the right L5 nerve root without mass effect. No significant neural foraminal  stenosis. Facet degeneration at this level is mild. L5-S1: Normal disc height and signal. No herniation. Moderate right-sided facet degenerative change.. No spinal canal or neural foraminal stenosis.     IMPRESSION: 1.  L1-L2 disc bulge containing dorsal annular fissure with superimposed left central disc extrusion produce mild acquired and congenital spinal canal stenosis. Disc extrusion abuts and displaces the left L2 nerve root in the lateral recess. 2.  L4-L5 right central disc extrusion containing annular fissure, contacts the right L5 nerve root without mass effect.          Eli Silverman P-C  [facility]  O. [phone]

## 2024-01-08 NOTE — PATIENT INSTRUCTIONS
~Please call our [facility] Nurse Navigation line [phone] with any questions or concerns about your treatment plan, if symptoms worsen and you would like to be seen urgently, or if you have any new or worsening numbness, weakness, or problems controlling bladder and bowel function.  ~You are also welcome to contact Eli Silverman via [employer], but please be aware that responses to [employer] message may take 2-3 days due to the high volume of patients seen in clinic.     ~You have been referred for Physical Therapy to [facility]. They will call you to schedule an appointment.      Scheduling phone number is [phone] for [facility], or [place] location.  If you have not heard from the scheduling office within 2 business days, please call [phone] for ALL other locations.    Discussed the importance of core strengthening, ROM, stretching exercises and how each of these entities is important in decreasing pain and improving long term spine health.  The purpose of physical therapy is to teach you an individualized home exercise program.  These exercises need to be performed every day in order to decrease pain and prevent future occurrences of pain.

## 2024-02-05 ENCOUNTER — PRE VISIT (OUTPATIENT)
Dept: SURGERY | Facility: CLINIC | Age: 49
End: 2024-02-05

## 2024-02-07 ENCOUNTER — PRE VISIT (OUTPATIENT)
Dept: SURGERY | Facility: CLINIC | Age: 49
End: 2024-02-07

## 2024-02-09 ENCOUNTER — PRE VISIT (OUTPATIENT)
Dept: SURGERY | Facility: CLINIC | Age: 49
End: 2024-02-09

## 2024-02-09 ENCOUNTER — VIRTUAL VISIT (OUTPATIENT)
Dept: SURGERY | Facility: CLINIC | Age: 49
End: 2024-02-09
Payer: COMMERCIAL

## 2024-02-09 DIAGNOSIS — Z12.11 SCREEN FOR COLON CANCER: ICD-10-CM

## 2024-02-09 DIAGNOSIS — Z01.818 PREOP EXAMINATION: Primary | ICD-10-CM

## 2024-02-09 PROCEDURE — 99203 OFFICE O/P NEW LOW 30 MIN: CPT | Mod: 95 | Performed by: PHYSICIAN ASSISTANT

## 2024-02-09 RX ORDER — IBUPROFEN 200 MG
200 TABLET ORAL EVERY 4 HOURS PRN
COMMUNITY

## 2024-02-09 ASSESSMENT — LIFESTYLE VARIABLES: TOBACCO_USE: 0

## 2024-02-09 ASSESSMENT — ENCOUNTER SYMPTOMS: SEIZURES: 0

## 2024-02-09 NOTE — PATIENT INSTRUCTIONS
Name:  Rigoberto Boss   MRN:  [id number]   :  1975   Today's Date:  2024     GI Lab procedures:    A representative from the GI Lab will contact you regarding arrival date and time.      You were seen today in the PAC Clinic.   (Pre-operative Anesthesia Assessment Center)  [facility] [place] [address]   phone [phone]    You had a pre-operative assessment done.    Anesthesia recommendations for medications:    Hold Aspirin for 2 days before procedure.  Hold Multivitamins for 7 days before procedure.   Hold Herbal medications and Supplements for 7 days before procedure.  Hold Ibuprofen for 2 days before procedure.   Hold Naproxen for 2 days before procedure.     No alcohol or cannabis products for 24 hours before your procedure    Please hold the following medications the day of procedure:  None      Please take these medications the day of procedure:  Xanax as needed. Celexa. Gabapentin.            For questions or appointments, call:  [facility]: [phone], option 2.  Monday through Friday, 8 a.m. to 4:30 p.m.  (If it is after hours, please reach out to the clinic or provider that scheduled your appointment)

## 2024-02-09 NOTE — H&P
Pre-Operative H & P     CC:  Preoperative exam to assess for increased cardiopulmonary risk while undergoing surgery and anesthesia.    Date of Encounter: 2/9/2024  Primary Care Physician:  No Ref-Primary, Physician     Reason for visit:   Encounter Diagnoses   Name Primary?    Screen for colon cancer Yes    Preop examination        HPI  Rigoberto Boss is a 48 year old male who presents for pre-operative H & P in preparation for  Procedure Information       Case: [id number] Date/Time: 03/01/24 0745    Procedure: Colonoscopy (Anus)    Anesthesia type: Moderate Sedation    Diagnosis: Screen for colon cancer [Z12.11]    Pre-op diagnosis: Screen for colon cancer [Z12.11]    Location:  GI 01 /  GI    Providers: Frederick Sanchez MD            Patient is being evaluated for comorbid conditions of HLD, LORENZA, morbid obesity, anxiety, chronic LBP.    Mr. Boss now presents for a routine screening for colon cancer.    History is obtained from the patient and chart review    Hx of abnormal bleeding or anti-platelet use: denies      Past Medical History  Past Medical History:   Diagnosis Date    Benign carcinoid tumor of ileum (H28) 2003    s/p resection    Morbid obesity (H)     LORENZA on CPAP        Past Surgical History  Past Surgical History:   Procedure Laterality Date    APPENDECTOMY  2003       Prior to Admission Medications  Current Outpatient Medications   Medication Sig Dispense Refill    ALPRAZolam (XANAX) 0.5 MG tablet Take 0.5 mg by mouth as needed      citalopram (CELEXA) 40 MG tablet Take 40 mg by mouth every morning      gabapentin (NEURONTIN) 400 MG capsule Take 400 mg by mouth as needed      ibuprofen (ADVIL/MOTRIN) 200 MG tablet Take 200 mg by mouth every 4 hours as needed for pain         Allergies  No Known Allergies    Social History  Social History     Socioeconomic History    Marital status:      Spouse name: Not on file    Number of children: Not on file    Years of education: Not on file     Highest education level: Not on file   Occupational History    Occupation:    Tobacco Use    Smoking status: Never     Passive exposure: Never    Smokeless tobacco: Never   Vaping Use    Vaping Use: Never used   Substance and Sexual Activity    Alcohol use: Yes     Comment: 3 drinks a day    Drug use: Never    Sexual activity: Yes     Partners: Female   Other Topics Concern    Not on file   Social History Narrative    Not on file     Social Determinants of Health     Financial Resource Strain: Not on file   Food Insecurity: Not on file   Transportation Needs: Not on file   Physical Activity: Not on file   Stress: Not on file   Social Connections: Not on file   Interpersonal Safety: Low Risk  (11/15/2023)    Interpersonal Safety     Do you feel physically and emotionally safe where you currently live?: Yes     Within the past 12 months, have you been hit, slapped, kicked or otherwise physically hurt by someone?: No     Within the past 12 months, have you been humiliated or emotionally abused in other ways by your partner or ex-partner?: No   Housing Stability: Not on file       Family History  Family History   Problem Relation Age of Onset    Anesthesia Reaction No family hx of     Deep Vein Thrombosis (DVT) No family hx of        Review of Systems  The complete review of systems is negative other than noted in the HPI or here.     Anesthesia Evaluation   Pt has had prior anesthetic.     No history of anesthetic complications       ROS/MED HX  ENT/Pulmonary:     (+) sleep apnea, uses CPAP,                            recent URI, resolved,      (-) tobacco use and asthma   Neurologic:  - neg neurologic ROS  (-) no seizures and no CVA   Cardiovascular:     (+) Dyslipidemia - -   -  - -                                      METS/Exercise Tolerance: >4 METS    Hematologic:  - neg hematologic  ROS  (-) history of blood clots and history of blood transfusion   Musculoskeletal: Comment: Chronic LBP   - neg  musculoskeletal ROS     GI/Hepatic: Comment: Hx benign tumor of ileum     (-) GERD and liver disease   Renal/Genitourinary:  - neg Renal ROS  (-) renal disease   Endo:     (+)               Obesity,    (-) Type II DM   Psychiatric/Substance Use:     (+) psychiatric history anxiety       Infectious Disease:  - neg infectious disease ROS     Malignancy:  - neg malignancy ROS     Other:  - neg other ROS          Virtual visit -  No vitals were obtained    Physical Exam  Constitutional: Awake, alert, cooperative, no apparent distress, and appears stated age.  HENT: Normocephalic  Respiratory: non labored breathing   Neurologic: Awake, alert, oriented to name, place and time.   Neuropsychiatric: Calm, cooperative. Normal affect.      Prior Labs/Diagnostic Studies   All labs and imaging personally reviewed       The patient's records and results personally reviewed by this provider.         Assessment    Rigoberto Boss is a 48 year old male seen as a PAC referral for risk assessment and optimization for anesthesia.    Plan/Recommendations  Pt will be optimized for the proposed procedure.  See below for details on the assessment, risk, and preoperative recommendations    NEUROLOGY  - No history of TIA, CVA or seizure    -Post Op delirium risk factors:  No risk identified    ENT  - No current airway concerns.  Will need to be reassessed day of surgery.  Mallampati: Unable to assess  TM: Unable to assess    CARDIAC  - No history of CAD, Hypertension, and Afib  - METS (Metabolic Equivalents)  Patient performs 4 or more METS exercise without symptoms            Total Score: 0      RCRI-Very low risk: Class 1 0.4% complication rate            Total Score: 0        PULMONARY  - LORENZA w/ CPAP     - Denies asthma or inhaler use  - Tobacco History    History   Smoking Status    Never   Smokeless Tobacco    Never       GI  - Denies GERD  PONV Medium Risk  Total Score: 2           1 AN PONV: Patient is not a current smoker    1 AN  "PONV: Intended Post Op Opioids          ENDOCRINE    - BMI: Estimated body mass index is 45.61 kg/m  as calculated from the following:    Height as of 1/8/24: 1.795 m (5' 10.67\").    Weight as of 1/8/24: 147 kg (324 lb).  Class 3 Obesity (BMI > 40)  - No history of Diabetes Mellitus    HEME  VTE Low Risk 0.5%            Total Score: 3    VTE: BMI greater than 39    VTE: Male      - No history of abnormal bleeding or antiplatelet use.        The patient is aware that the final anesthesia plan will be decided by the assigned anesthesia provider on the date of service.      The patient is optimized for their procedure. AVS with information on meds given by nursing staff. No further diagnostic testing indicated.    Please refer to the physical examination documented by the anesthesiologist in the anesthesia record on the day of surgery.    Video-Visit Details    Type of service:  Video Visit    Provider received verbal consent for a Video Visit from the patient? Yes     Originating Location (pt. Location): Home    Distant Location (provider location):  Off-site  Mode of Communication:  Video Conference via [employer]  On the day of service:     Prep time: 11 minutes  Visit time: 18 minutes  Documentation time: 9 minutes  ------------------------------------------  Total time: 38 minutes      Nayla Blancas PA-C  Preoperative Assessment Center  [facility]  Clinic and [facility] Center  Phone: [phone]  Fax: [phone]    "

## 2024-02-09 NOTE — PROGRESS NOTES
Rigoberto is a 48 year old who is being evaluated via a billable video visit.      How would you like to obtain your AVS? Sonam Reyes   Rigoberto is a 48 year old, presenting for the following health issues:  Pre-Op Exam        JAXON Andrea LPN

## 2024-02-16 ENCOUNTER — TELEPHONE (OUTPATIENT)
Dept: GASTROENTEROLOGY | Facility: CLINIC | Age: 49
End: 2024-02-16
Payer: COMMERCIAL

## 2024-02-16 DIAGNOSIS — Z12.11 SPECIAL SCREENING FOR MALIGNANT NEOPLASMS, COLON: Primary | ICD-10-CM

## 2024-02-16 RX ORDER — BISACODYL 5 MG/1
TABLET, DELAYED RELEASE ORAL
Qty: 4 TABLET | Refills: 0 | Status: SHIPPED | OUTPATIENT
Start: 2024-02-16

## 2024-02-16 NOTE — TELEPHONE ENCOUNTER
Pre visit planning completed.      Procedure details:    Patient scheduled for Colonoscopy  on 3.1.24.     Arrival time: 0645. Procedure time 0745    Pre op exam needed? N/A    [facility] [address], [place] [address]    Sedation type: Conscious sedation    Indication for procedure: Screening      Chart review:     Electronic implanted devices? No    Recent diagnosis of diverticulitis within the last 6 weeks? No    Diabetic? No      Medication review:    Anticoagulants? No    NSAIDS? Yes.  Ibuprofen (Advil, Motrin).  Holding interval of 1 day before procedure.    Other medication HOLDING recommendations:  N/A      Prep for procedure:     Bowel prep recommendation: Extended prep Golytely    Due to:  BMI > 40.     Prep instructions sent via [employer] Bowel prep script sent to [id number] - [place], [place] - [address] [facility]        Kelly Dukes RN  Endoscopy Procedure Pre Assessment RN  [phone] option 4

## 2024-02-19 NOTE — TELEPHONE ENCOUNTER
Attempted to contact patient in order to complete pre assessment questions.     No answer. Left message to return call to [phone] option 4    Missed call communication sent via [employer].    Aye Callejas RN  Endoscopy Procedure Pre Assessment RN  [phone] option 4

## 2024-02-19 NOTE — TELEPHONE ENCOUNTER
Pre assessment completed for upcoming procedure.   (Please see previous telephone encounter notes for complete details)    Patient  returned call.       Procedure details:    Arrival time and facility location reviewed.    Pre op exam needed? N/A    Designated  policy reviewed. Instructed to have someone stay 6  hours post procedure.     COVID policy reviewed.      Medication review:    Medications reviewed. Please see supporting documentation below. Holding recommendations discussed (if applicable).       Prep for procedure:     Procedure prep instructions reviewed.        Any additional information needed:  N/A      Patient  verbalized understanding and had no questions or concerns at this time.      Marva Charles RN  Endoscopy Procedure Pre Assessment RN  [phone] option 4

## 2024-02-28 NOTE — TELEPHONE ENCOUNTER
Incoming call from patient.      Patient would like to know if he can have beef broth on CLD.  Writer reassured patient he can have any broth on CLD as long as there isn't anything else in it.      The patient verbalizes understanding and agrees to plan.    Corinne Kliber, RN  Endoscopy Procedure Pre Assessment RN  [phone] option 4

## 2024-03-01 ENCOUNTER — HOSPITAL ENCOUNTER (OUTPATIENT)
Facility: CLINIC | Age: 49
Discharge: HOME | End: 2024-03-01
Attending: INTERNAL MEDICINE | Admitting: INTERNAL MEDICINE
Payer: COMMERCIAL

## 2024-03-01 VITALS
SYSTOLIC BLOOD PRESSURE: 131 MMHG | OXYGEN SATURATION: 95 % | DIASTOLIC BLOOD PRESSURE: 88 MMHG | HEART RATE: 70 BPM | RESPIRATION RATE: 16 BRPM

## 2024-03-01 LAB — COLONOSCOPY: NORMAL

## 2024-03-01 PROCEDURE — G0121 COLON CA SCRN NOT HI RSK IND: HCPCS | Performed by: INTERNAL MEDICINE

## 2024-03-01 PROCEDURE — G0500 MOD SEDAT ENDO SERVICE >5YRS: HCPCS | Performed by: INTERNAL MEDICINE

## 2024-03-01 PROCEDURE — 250N000011 HC RX IP 250 OP 636: Performed by: INTERNAL MEDICINE

## 2024-03-01 PROCEDURE — 45378 DIAGNOSTIC COLONOSCOPY: CPT | Performed by: INTERNAL MEDICINE

## 2024-03-01 RX ORDER — NALOXONE HYDROCHLORIDE 0.4 MG/ML
0.2 INJECTION, SOLUTION INTRAMUSCULAR; INTRAVENOUS; SUBCUTANEOUS
Status: DISCONTINUED | OUTPATIENT
Start: 2024-03-01 | End: 2024-03-01 | Stop reason: HOSPADM

## 2024-03-01 RX ORDER — LIDOCAINE 40 MG/G
CREAM TOPICAL
Status: DISCONTINUED | OUTPATIENT
Start: 2024-03-01 | End: 2024-03-01 | Stop reason: HOSPADM

## 2024-03-01 RX ORDER — FLUMAZENIL 0.1 MG/ML
0.2 INJECTION, SOLUTION INTRAVENOUS
Status: DISCONTINUED | OUTPATIENT
Start: 2024-03-01 | End: 2024-03-01 | Stop reason: HOSPADM

## 2024-03-01 RX ORDER — FENTANYL CITRATE 50 UG/ML
INJECTION, SOLUTION INTRAMUSCULAR; INTRAVENOUS PRN
Status: DISCONTINUED | OUTPATIENT
Start: 2024-03-01 | End: 2024-03-01 | Stop reason: HOSPADM

## 2024-03-01 RX ORDER — ONDANSETRON 2 MG/ML
4 INJECTION INTRAMUSCULAR; INTRAVENOUS EVERY 6 HOURS PRN
Status: DISCONTINUED | OUTPATIENT
Start: 2024-03-01 | End: 2024-03-01 | Stop reason: HOSPADM

## 2024-03-01 RX ORDER — ONDANSETRON 2 MG/ML
4 INJECTION INTRAMUSCULAR; INTRAVENOUS
Status: DISCONTINUED | OUTPATIENT
Start: 2024-03-01 | End: 2024-03-01 | Stop reason: HOSPADM

## 2024-03-01 RX ORDER — ONDANSETRON 4 MG/1
4 TABLET, ORALLY DISINTEGRATING ORAL EVERY 6 HOURS PRN
Status: DISCONTINUED | OUTPATIENT
Start: 2024-03-01 | End: 2024-03-01 | Stop reason: HOSPADM

## 2024-03-01 RX ORDER — PROCHLORPERAZINE MALEATE 5 MG
10 TABLET ORAL EVERY 6 HOURS PRN
Status: DISCONTINUED | OUTPATIENT
Start: 2024-03-01 | End: 2024-03-01 | Stop reason: HOSPADM

## 2024-03-01 RX ORDER — NALOXONE HYDROCHLORIDE 0.4 MG/ML
0.4 INJECTION, SOLUTION INTRAMUSCULAR; INTRAVENOUS; SUBCUTANEOUS
Status: DISCONTINUED | OUTPATIENT
Start: 2024-03-01 | End: 2024-03-01 | Stop reason: HOSPADM

## 2024-03-01 ASSESSMENT — ACTIVITIES OF DAILY LIVING (ADL)
ADLS_ACUITY_SCORE: 35
ADLS_ACUITY_SCORE: 33

## 2024-03-01 NOTE — H&P
Rigoberto Boss  [id number]  male  48 year old      Reason for procedure/surgery: screening    Patient Active Problem List   Diagnosis    Anxiety state    Hyperlipidemia    Lumbago    Obesity    LORENZA on CPAP    Morbid obesity (H)    Benign carcinoid tumor of ileum (H28)       Past Surgical History:    Past Surgical History:   Procedure Laterality Date    APPENDECTOMY  2003       Past Medical History:   Past Medical History:   Diagnosis Date    Benign carcinoid tumor of ileum (H28) 2003    s/p resection    Morbid obesity (H)     LORENZA on CPAP        Social History:   Social History     Tobacco Use    Smoking status: Never     Passive exposure: Never    Smokeless tobacco: Never   Substance Use Topics    Alcohol use: Yes     Comment: 3 drinks a day       Family History:   Family History   Problem Relation Age of Onset    Anesthesia Reaction No family hx of     Deep Vein Thrombosis (DVT) No family hx of        Allergies: No Known Allergies    Active Medications:   No current outpatient medications on file.       Systemic Review:   CONSTITUTIONAL: NEGATIVE for fever, chills, change in weight  ENT/MOUTH: NEGATIVE for ear, mouth and throat problems  RESP: NEGATIVE for significant cough or SOB  CV: NEGATIVE for chest pain, palpitations or peripheral edema    Physical Examination:   Vital Signs: /78   Pulse 74   Resp 16   SpO2 98%   GENERAL: healthy, alert and no distress  NECK: no adenopathy, no asymmetry, masses, or scars  RESP: lungs clear to auscultation - no rales, rhonchi or wheezes  CV: regular rate and rhythm, normal S1 S2, no S3 or S4, no murmur, click or rub, no peripheral edema and peripheral pulses strong  ABDOMEN: soft, nontender, no hepatosplenomegaly, no masses and bowel sounds normal  MS: no gross musculoskeletal defects noted, no edema    Plan: Appropriate to proceed as scheduled.      Frederick Sterling MD  3/1/2024    PCP:  No Ref-Primary, Physician

## 2024-03-01 NOTE — OR NURSING
Procedure: colonoscopy  Sedation: conscious sedation  Specimens: none.   O2: 2L/NC  Tolerated procedure: well  Pt to recovery area in stable condition accompanied by RN.   Other:  none    Cheli Carlisle RN

## 2024-07-01 ENCOUNTER — VIRTUAL VISIT (OUTPATIENT)
Dept: FAMILY MEDICINE | Facility: CLINIC | Age: 49
End: 2024-07-01
Payer: COMMERCIAL

## 2024-07-01 DIAGNOSIS — F10.21 ALCOHOL DEPENDENCE IN EARLY FULL REMISSION (H): Primary | ICD-10-CM

## 2024-07-01 PROCEDURE — 99212 OFFICE O/P EST SF 10 MIN: CPT | Mod: 95 | Performed by: PHYSICIAN ASSISTANT

## 2024-07-01 RX ORDER — HYDROCODONE BITARTRATE AND ACETAMINOPHEN 5; 325 MG/1; MG/1
1-2 TABLET ORAL EVERY 6 HOURS PRN
COMMUNITY
Start: 2024-07-01

## 2024-07-01 RX ORDER — HYDROXYZINE HYDROCHLORIDE 25 MG/1
25 TABLET, FILM COATED ORAL EVERY 4 HOURS PRN
COMMUNITY
Start: 2024-06-26

## 2024-07-01 RX ORDER — CLONIDINE HYDROCHLORIDE 0.1 MG/1
0.1 TABLET ORAL 4 TIMES DAILY PRN
COMMUNITY
Start: 2024-06-26

## 2024-07-01 RX ORDER — MELOXICAM 15 MG/1
1 TABLET ORAL DAILY
COMMUNITY
Start: 2024-07-01

## 2024-07-01 NOTE — LETTER
July 1, 2024      Rigoberto Boss  [address] [address]        To Whom It May Concern:    Rigoberto Boss was seen in our clinic. He may return to work on 7/8/24 without restrictions.      Sincerely,        LARON Tapia

## 2024-07-01 NOTE — PROGRESS NOTES
"Rigoberto is a 49 year old who is being evaluated via a billable video visit.    How would you like to obtain your AVS? MyChart  If the video visit is dropped, the invitation should be resent by: Text to cell phone: [phone]  Will anyone else be joining your video visit? No      Assessment & Plan     Alcohol dependence in early full remission (H)  Patient was in inpatient treatment for the last 3 weeks at [facility].  He has been 3 days  out and continues to be sober.  He has new anxiety medication which includes clonidine and hydroxyzine.  He feels in a good space mentally.  He is looking at going into meetings.  He denies any additional therapy, medication, or referrals from me today.  He did need a letter written for work to state that he can return on 8th of July without restrictions.  I wrote that today.  Follow-up in clinic as needed.        BMI  Estimated body mass index is 45.61 kg/m  as calculated from the following:    Height as of 1/8/24: 1.795 m (5' 10.67\").    Weight as of 1/8/24: 147 kg (324 lb).             Subjective   Rigoberto is a 49 year old, presenting for the following health issues:  Forms        7/1/2024     1:13 PM   Additional Questions   Roomed by Caroline DONAHUE MA         7/1/2024   Forms   Any forms needing to be completed Yes      History of Present Illness       Reason for visit:  Letter to return to work    Additional provider notes :   Patient presents for virtual visit today to discuss letter for work.  About 3 weeks ago he decided to go to inpatient treatment for alcoholism at [facility].  He states his alcoholism was a slow rolling problem that he eventually realized that he needed help.  He has been out for 3 days and has not relapsed.  He has scoped out some meetings.  He was also placed on different medication such as clonidine and hydroxyzine to help with his anxiety rather than the Xanax.  He feels in a very good place right now.  He is ready to return to work next week on the " eighth.  He needs a note stating that he can go back to work.  He has no other questions or concerns today.              Review of Systems  Constitutional, HEENT, cardiovascular, pulmonary, gi and gu systems are negative, except as otherwise noted.      Objective           Vitals:  No vitals were obtained today due to virtual visit.    Physical Exam   GENERAL: alert and no distress  EYES: Eyes grossly normal to inspection.  No discharge or erythema, or obvious scleral/conjunctival abnormalities.  RESP: No audible wheeze, cough, or visible cyanosis.    SKIN: Visible skin clear. No significant rash, abnormal pigmentation or lesions.  NEURO: Cranial nerves grossly intact.  Mentation and speech appropriate for age.  PSYCH: Appropriate affect, tone, and pace of words          Video-Visit Details    Type of service:  Video Visit   Originating Location (pt. Location): Home    Distant Location (provider location):  Off-site  Platform used for Video Visit: Matthew  Signed Electronically by: LARON Tapia

## 2024-11-01 ENCOUNTER — TELEPHONE (OUTPATIENT)
Dept: FAMILY MEDICINE | Facility: CLINIC | Age: 49
End: 2024-11-01
Payer: COMMERCIAL

## 2024-11-19 ENCOUNTER — TELEPHONE (OUTPATIENT)
Dept: FAMILY MEDICINE | Facility: CLINIC | Age: 49
End: 2024-11-19
Payer: COMMERCIAL

## 2024-11-19 NOTE — TELEPHONE ENCOUNTER
Forms/Letter Request    Type of form/letter: OTHER: Return to work note       Do we have the form/letter: No    Who is the form from? Patient    Where did/will the form come from? Needs letter for work    When is form/letter needed by: Wednesday 11-20-24 to be able to return to work by next Monday 11=25=24    How would you like the form/letter returned:  email    Patient Notified form requests are processed in 5-7 business days:Yes    Could we send this information to you in [employer] or would you prefer to receive a phone call?:   Patient would prefer a phone call   Okay to leave a detailed message?: Yes at Home number on file [phone] (home)

## 2024-11-19 NOTE — TELEPHONE ENCOUNTER
I would need an office visit to discuss before writing a letter.     Thank you,  Pamela Ruth PA-C

## 2024-11-19 NOTE — TELEPHONE ENCOUNTER
Routing to Pamela Ruth. Patient has been out of work for about 2 weeks and is needing a return to work note with him returning to work on 11/25/24.  Are you willing to write this letter without a virtual appointment?  Please advise.  Patient is needing the letter by tomorrow.    CAROLINA Eason  [facility]

## 2024-12-18 ENCOUNTER — OFFICE VISIT (OUTPATIENT)
Dept: FAMILY MEDICINE | Facility: CLINIC | Age: 49
End: 2024-12-18
Payer: COMMERCIAL

## 2024-12-18 ENCOUNTER — MYC MEDICAL ADVICE (OUTPATIENT)
Dept: FAMILY MEDICINE | Facility: CLINIC | Age: 49
End: 2024-12-18

## 2024-12-18 VITALS
HEART RATE: 103 BPM | DIASTOLIC BLOOD PRESSURE: 81 MMHG | TEMPERATURE: 97.1 F | BODY MASS INDEX: 44.1 KG/M2 | HEIGHT: 71 IN | OXYGEN SATURATION: 98 % | SYSTOLIC BLOOD PRESSURE: 129 MMHG | RESPIRATION RATE: 24 BRPM | WEIGHT: 315 LBS

## 2024-12-18 DIAGNOSIS — Z13.1 SCREENING FOR DIABETES MELLITUS: ICD-10-CM

## 2024-12-18 DIAGNOSIS — G89.29 CHRONIC BILATERAL LOW BACK PAIN WITHOUT SCIATICA: ICD-10-CM

## 2024-12-18 DIAGNOSIS — D22.9 ATYPICAL MOLE: ICD-10-CM

## 2024-12-18 DIAGNOSIS — E78.5 HYPERLIPIDEMIA, UNSPECIFIED HYPERLIPIDEMIA TYPE: ICD-10-CM

## 2024-12-18 DIAGNOSIS — E66.01 MORBID OBESITY (H): ICD-10-CM

## 2024-12-18 DIAGNOSIS — D3A.012 BENIGN CARCINOID TUMOR OF ILEUM (H): ICD-10-CM

## 2024-12-18 DIAGNOSIS — M54.50 CHRONIC BILATERAL LOW BACK PAIN WITHOUT SCIATICA: ICD-10-CM

## 2024-12-18 DIAGNOSIS — Z00.00 VISIT FOR PREVENTIVE HEALTH EXAMINATION: Primary | ICD-10-CM

## 2024-12-18 LAB
CHOLEST SERPL-MCNC: 248 MG/DL
FASTING STATUS PATIENT QL REPORTED: YES
FASTING STATUS PATIENT QL REPORTED: YES
GLUCOSE SERPL-MCNC: 103 MG/DL (ref 70–99)
HDLC SERPL-MCNC: 83 MG/DL
LDLC SERPL CALC-MCNC: 98 MG/DL
NONHDLC SERPL-MCNC: 165 MG/DL
TRIGL SERPL-MCNC: 337 MG/DL

## 2024-12-18 PROCEDURE — 90471 IMMUNIZATION ADMIN: CPT

## 2024-12-18 PROCEDURE — 82947 ASSAY GLUCOSE BLOOD QUANT: CPT

## 2024-12-18 PROCEDURE — 99396 PREV VISIT EST AGE 40-64: CPT | Mod: 25

## 2024-12-18 PROCEDURE — 99213 OFFICE O/P EST LOW 20 MIN: CPT | Mod: 25

## 2024-12-18 PROCEDURE — 90750 HZV VACC RECOMBINANT IM: CPT

## 2024-12-18 PROCEDURE — 36415 COLL VENOUS BLD VENIPUNCTURE: CPT

## 2024-12-18 PROCEDURE — 80061 LIPID PANEL: CPT

## 2024-12-18 RX ORDER — HYDROCODONE BITARTRATE AND ACETAMINOPHEN 5; 325 MG/1; MG/1
1-2 TABLET ORAL EVERY 6 HOURS PRN
Qty: 10 TABLET | Refills: 0 | Status: SHIPPED | OUTPATIENT
Start: 2024-12-18

## 2024-12-18 RX ORDER — BUSPIRONE HYDROCHLORIDE 10 MG/1
TABLET ORAL
COMMUNITY
Start: 2024-12-03

## 2024-12-18 RX ORDER — ALPRAZOLAM 0.5 MG
TABLET ORAL
COMMUNITY
Start: 2024-11-18

## 2024-12-18 RX ORDER — MELOXICAM 15 MG/1
15 TABLET ORAL DAILY
Qty: 30 TABLET | Refills: 1 | Status: SHIPPED | OUTPATIENT
Start: 2024-12-18

## 2024-12-18 SDOH — HEALTH STABILITY: PHYSICAL HEALTH: ON AVERAGE, HOW MANY MINUTES DO YOU ENGAGE IN EXERCISE AT THIS LEVEL?: 10 MIN

## 2024-12-18 SDOH — HEALTH STABILITY: PHYSICAL HEALTH: ON AVERAGE, HOW MANY DAYS PER WEEK DO YOU ENGAGE IN MODERATE TO STRENUOUS EXERCISE (LIKE A BRISK WALK)?: 1 DAY

## 2024-12-18 ASSESSMENT — PATIENT HEALTH QUESTIONNAIRE - PHQ9: SUM OF ALL RESPONSES TO PHQ QUESTIONS 1-9: 3

## 2024-12-18 ASSESSMENT — ENCOUNTER SYMPTOMS: BACK PAIN: 1

## 2024-12-18 ASSESSMENT — SOCIAL DETERMINANTS OF HEALTH (SDOH): HOW OFTEN DO YOU GET TOGETHER WITH FRIENDS OR RELATIVES?: ONCE A WEEK

## 2024-12-18 NOTE — PROGRESS NOTES
"Preventive Care Visit  [facility]  HALEY Trinh CNP, Nurse Practitioner Primary Care  Dec 18, 2024      Assessment & Plan     Visit for preventive health examination    Hyperlipidemia, unspecified hyperlipidemia type  Fasting   - Lipid panel reflex to direct LDL Fasting; Future  - Lipid panel reflex to direct LDL Fasting    Screening for diabetes mellitus  Fasting   - Glucose; Future  - Glucose    Benign carcinoid tumor of ileum (H)  Colonoscopy completed 3/24 and normal. Per note, patient is to complete follow up colonoscopy in 10 years     Morbid obesity (H)  Discussed, most likely causing increased back pain. Patient working on nutrition and exercise to help with weight loss.     Chronic bilateral low back pain without sciatica  Waxes and wanes. Plan for patient to lose weight. Mobic ordered for patient to use as needed along with 10 tabs of Norco which he has used in the past. Patient has a prescription for Xanax but reports he only takes this as needed. Instructed patient that he should not xanax and norco together. Patient reports understanding.   - meloxicam (MOBIC) 15 MG tablet; Take 1 tablet (15 mg) by mouth daily.  - HYDROcodone-acetaminophen (NORCO) 5-325 MG tablet; Take 1-2 tablets by mouth every 6 hours as needed for pain.    Atypical mole  Right groin. Derm referral placed for further evaluation.   - Adult Dermatology  Referral; Future    BMI  Estimated body mass index is 47.2 kg/m  as calculated from the following:    Height as of this encounter: 1.803 m (5' 11\").    Weight as of this encounter: 153.5 kg (338 lb 6.4 oz).       Counseling  Appropriate preventive services were addressed with this patient via screening, questionnaire, or discussion as appropriate for fall prevention, nutrition, physical activity, Tobacco-use cessation, social engagement, weight loss and cognition.  Checklist reviewing preventive services available has been given to the " patient.  Reviewed patient's diet, addressing concerns and/or questions.   He is at risk for lack of exercise and has been provided with information to increase physical activity for the benefit of his well-being.   The patient was instructed to see the dentist every 6 months.   He is at risk for psychosocial distress and has been provided with information to reduce risk.     Eric Franklin is a 49 year old, presenting for the following:  Physical and Back Pain        12/18/2024     7:26 AM   Additional Questions   Roomed by jd alejandra          Back Pain       Concern - back pain  Onset: on and off for years  Description: back goes out, shooting on left side going up, soreness, shocking, sometimes cannot role over  Intensity: severe  Progression of Symptoms:  worsening  Accompanying Signs & Symptoms: none  Previous history of similar problem: yes  Precipitating factors:        Worsened by: movement, reaching, bending  Alleviating factors:        Improved by: time, sometimes laying down, sometimes standing up, ice  Therapies tried and outcome: methylprednisolone and prednisone- unsure if helpful    Recently quit job, insurance ends at the end of the year.   Has been donating plasma    Health Care Directive  Patient does not have a Health Care Directive: Discussed advance care planning with patient; however, patient declined at this time.      12/18/2024   General Health   How would you rate your overall physical health? (!) FAIR   Feel stress (tense, anxious, or unable to sleep) Very much      (!) STRESS CONCERN      12/18/2024   Nutrition   Three or more servings of calcium each day? Yes   Diet: Regular (no restrictions)   How many servings of fruit and vegetables per day? (!) 0-1   How many sweetened beverages each day? 0-1            12/18/2024   Exercise   Days per week of moderate/strenous exercise 1 day   Average minutes spent exercising at this level 10 min      (!) EXERCISE CONCERN      12/18/2024    Social Factors   Frequency of gathering with friends or relatives Once a week   Worry food won't last until get money to buy more No   Food not last or not have enough money for food? No   Do you have housing? (Housing is defined as stable permanent housing and does not include staying ouside in a car, in a tent, in an abandoned building, in an overnight shelter, or couch-surfing.) Yes   Are you worried about losing your housing? No   Lack of transportation? No   Unable to get utilities (heat,electricity)? No          12/18/2024   Fall Risk   Fallen 2 or more times in the past year? No    Trouble with walking or balance? No        Patient-reported          12/18/2024   Dental   Dentist two times every year? (!) NO          12/18/2024   TB Screening   Were you born outside of the [place]? No        Today's PHQ-2 Score:       12/18/2024     7:35 AM   PHQ-2 ( 1999 [employer])   Q1: Little interest or pleasure in doing things 0   Q2: Feeling down, depressed or hopeless 0   PHQ-2 Score 0         12/18/2024   Substance Use   Alcohol more than 3/day or more than 7/wk No   Do you use any other substances recreationally? No        Social History     Tobacco Use    Smoking status: Never     Passive exposure: Never    Smokeless tobacco: Never   Vaping Use    Vaping status: Never Used   Substance Use Topics    Alcohol use: Yes     Comment: 3 drinks a day    Drug use: Never           12/18/2024   STI Screening   New sexual partner(s) since last STI/HIV test? No      ASCVD Risk   The 10-year ASCVD risk score (Moy GREEN, et al., 2019) is: 2.7%    Values used to calculate the score:      Age: 49 years      Sex: Male      Is Non- : No      Diabetic: No      Tobacco smoker: No      Systolic Blood Pressure: 129 mmHg      Is BP treated: No      HDL Cholesterol: 82 mg/dL      Total Cholesterol: 239 mg/dL            12/18/2024   Contraception/Family Planning   Questions about contraception or family planning No  "          Reviewed and updated as needed this visit by Provider   Tobacco  Allergies  Meds  Problems  Med Hx  Surg Hx  Fam Hx                  Review of Systems  Constitutional, HEENT, cardiovascular, pulmonary, gi and gu systems are negative, except as otherwise noted.     Objective    Exam  /81 (BP Location: Left arm, Patient Position: Sitting, Cuff Size: Adult Large)   Pulse 103   Temp 97.1  F (36.2  C) (Temporal)   Resp 24   Ht 1.803 m (5' 11\")   Wt (!) 153.5 kg (338 lb 6.4 oz)   SpO2 98%   BMI 47.20 kg/m     Estimated body mass index is 47.2 kg/m  as calculated from the following:    Height as of this encounter: 1.803 m (5' 11\").    Weight as of this encounter: 153.5 kg (338 lb 6.4 oz).    Physical Exam  GENERAL: alert and no distress  EYES: Eyes grossly normal to inspection, PERRL and conjunctivae and sclerae normal  HENT: ear canals and TM's normal, nose and mouth without ulcers or lesions  NECK: no adenopathy, no asymmetry, masses, or scars  RESP: lungs clear to auscultation - no rales, rhonchi or wheezes  CV: regular rate and rhythm, normal S1 S2, no S3 or S4, no murmur, click or rub, no peripheral edema  ABDOMEN: soft, nontender, no hepatosplenomegaly, no masses and bowel sounds normal  MS: no gross musculoskeletal defects noted, no edema  SKIN: dark brown, slightly rough, normal boarders mole, 1.5cm-  right groin  NEURO: Normal strength and tone, mentation intact and speech normal  PSYCH: mentation appears normal, affect normal/bright    Signed Electronically by: HALEY Trinh CNP    "

## 2024-12-19 ENCOUNTER — OFFICE VISIT (OUTPATIENT)
Dept: OPTOMETRY | Facility: CLINIC | Age: 49
End: 2024-12-19
Payer: COMMERCIAL

## 2024-12-19 DIAGNOSIS — H52.13 MYOPIA OF BOTH EYES: ICD-10-CM

## 2024-12-19 DIAGNOSIS — Z01.00 ENCOUNTER FOR EXAMINATION OF EYES AND VISION WITHOUT ABNORMAL FINDINGS: Primary | ICD-10-CM

## 2024-12-19 DIAGNOSIS — H52.4 PRESBYOPIA: ICD-10-CM

## 2024-12-19 DIAGNOSIS — H52.221 REGULAR ASTIGMATISM OF RIGHT EYE: ICD-10-CM

## 2024-12-19 ASSESSMENT — CONF VISUAL FIELD
OS_INFERIOR_NASAL_RESTRICTION: 0
METHOD: COUNTING FINGERS
OD_SUPERIOR_NASAL_RESTRICTION: 0
OD_SUPERIOR_TEMPORAL_RESTRICTION: 0
OS_SUPERIOR_TEMPORAL_RESTRICTION: 0
OS_INFERIOR_TEMPORAL_RESTRICTION: 0
OD_NORMAL: 1
OS_SUPERIOR_NASAL_RESTRICTION: 0
OD_INFERIOR_NASAL_RESTRICTION: 0
OS_NORMAL: 1
OD_INFERIOR_TEMPORAL_RESTRICTION: 0

## 2024-12-19 ASSESSMENT — VISUAL ACUITY
OD_SC: 20/50
OD_SC+: +2
OD_SC: 20/20-2
OS_SC: 20/20
METHOD: SNELLEN - LINEAR
OS_SC: 20/80-1

## 2024-12-19 ASSESSMENT — REFRACTION_MANIFEST
OD_AXIS: 007
OD_CYLINDER: +1.25
OS_SPHERE: -0.50
OD_AXIS: 004
OS_SPHERE: -0.25
OD_SPHERE: -2.00
OS_CYLINDER: +0.25
OD_CYLINDER: +1.50
OS_ADD: +1.75
OD_ADD: +1.75
OD_SPHERE: -2.00
OS_AXIS: 177
METHOD_AUTOREFRACTION: 1
OS_CYLINDER: SPHERE

## 2024-12-19 ASSESSMENT — CUP TO DISC RATIO
OD_RATIO: 0.15
OS_RATIO: 0.15

## 2024-12-19 ASSESSMENT — EXTERNAL EXAM - RIGHT EYE: OD_EXAM: NORMAL

## 2024-12-19 ASSESSMENT — KERATOMETRY
OS_K1POWER_DIOPTERS: 44.50
OS_AXISANGLE2_DEGREES: 178
OD_K2POWER_DIOPTERS: 45.00
OD_AXISANGLE2_DEGREES: 164
OS_K2POWER_DIOPTERS: 44.75
OD_AXISANGLE_DEGREES: 074
OS_AXISANGLE_DEGREES: 088
OD_K1POWER_DIOPTERS: 44.50

## 2024-12-19 ASSESSMENT — TONOMETRY
OD_IOP_MMHG: 16
OS_IOP_MMHG: 16
IOP_METHOD: APPLANATION

## 2024-12-19 ASSESSMENT — EXTERNAL EXAM - LEFT EYE: OS_EXAM: NORMAL

## 2024-12-19 ASSESSMENT — SLIT LAMP EXAM - LIDS
COMMENTS: NORMAL
COMMENTS: NORMAL

## 2024-12-19 NOTE — PROGRESS NOTES
"Chief Complaint   Patient presents with    Annual Eye Exam    Floaters      -Floaters each eye - small squiggly lines- ongoing for a few yrs     Last Eye Exam: Never had one   Dilated Previously: No, side effects of dilation explained today    What are you currently using to see?  +1.00 OTC readers - uses for reading/phone - did not bring     -Has never worn Rx correction     Distance Vision Acuity: Satisfied with vision    Near Vision Acuity: Not satisfied - vision seems to fluctuate quite a bit - sometimes needs readers for phone, sometimes does not     Eye Comfort: watery and burning   Do you use eye drops? 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       OBJECTIVE: See Ophthalmology exam    ASSESSMENT:    ICD-10-CM    1. Encounter for examination of eyes and vision without abnormal findings  Z01.00       2. Myopia of both eyes  H52.13       3. Regular astigmatism of right eye  H52.221       4. Presbyopia  H52.4           PLAN:    Rigoberto Boss aware  eye exam results will be sent to No Ref-Primary, Physician.  Patient Instructions   Updated glasses prescription provided today.   Allow 2 weeks to adapt to the new glasses.   Discussed option of bifocal, progressive or single vision lenses.     I recommend using artificial tears when your eyes feel dry. There are over the counter drops that work well and may be used up to 4x daily (Systane , Refresh, Thera Tears)- avoid \"get the red out\" drops.    Return in 2 years for a comprehensive eye exam, or sooner if needed.      The effects of the dilating drops last for 4- 6 hours.  You will be more sensitive to light and vision will be blurry up close.  Mydriatic sunglasses were given if needed.     Kehinde Allen, OD  [facility] [place]  [address] [place] [address]. [place]  [address] [phone]         "

## 2024-12-19 NOTE — PATIENT INSTRUCTIONS
"Updated glasses prescription provided today.   Allow 2 weeks to adapt to the new glasses.   Discussed option of bifocal, progressive or single vision lenses.     I recommend using artificial tears when your eyes feel dry. There are over the counter drops that work well and may be used up to 4x daily (Systane , Refresh, Thera Tears)- avoid \"get the red out\" drops.    Return in 2 years for a comprehensive eye exam, or sooner if needed.      The effects of the dilating drops last for 4- 6 hours.  You will be more sensitive to light and vision will be blurry up close.  Mydriatic sunglasses were given if needed.     Kehinde Allen, OD  [facility]  [address] [place] [address]. [place]  [address]    [phone]   "

## 2024-12-19 NOTE — LETTER
"12/19/2024      Flacozelalem Adlerd  [address] [facility] [address]      Dear Colleague,    Thank you for referring your patient, Rigoberto Boss, to the [facility]. Please see a copy of my visit note below.    Chief Complaint   Patient presents with     Annual Eye Exam     Floaters      -Floaters each eye - small squiggly lines- ongoing for a few yrs     Last Eye Exam: Never had one   Dilated Previously: No, side effects of dilation explained today    What are you currently using to see?  +1.00 OTC readers - uses for reading/phone - did not bring     -Has never worn Rx correction     Distance Vision Acuity: Satisfied with vision    Near Vision Acuity: Not satisfied - vision seems to fluctuate quite a bit - sometimes needs readers for phone, sometimes does not     Eye Comfort: watery and burning   Do you use eye drops? 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       OBJECTIVE: See Ophthalmology exam    ASSESSMENT:    ICD-10-CM    1. Encounter for examination of eyes and vision without abnormal findings  Z01.00       2. Myopia of both eyes  H52.13       3. Regular astigmatism of right eye  H52.221       4. Presbyopia  H52.4           PLAN:    Rigoberto Boss aware  eye exam results will be sent to No Ref-Primary, Physician.  Patient Instructions   Updated glasses prescription provided today.   Allow 2 weeks to adapt to the new glasses.   Discussed option of bifocal, progressive or single vision lenses.     I recommend using artificial tears when your eyes feel dry. There are over the counter drops that work well and may be used up to 4x daily (Systane , Refresh, Thera Tears)- avoid \"get the red out\" drops.    Return in 2 years for a comprehensive eye exam, or sooner if needed.      The effects of the dilating drops last for 4- 6 hours.  You will be " more sensitive to light and vision will be blurry up close.  Mydriatic sunglasses were given if needed.     Kehinde Allen OD  [address] [place]  [address] [phone]           Again, thank you for allowing me to participate in the care of your patient.        Sincerely,        Kehinde Allen OD

## 2024-12-30 ENCOUNTER — MYC REFILL (OUTPATIENT)
Dept: FAMILY MEDICINE | Facility: CLINIC | Age: 49
End: 2024-12-30
Payer: COMMERCIAL

## 2024-12-30 DIAGNOSIS — G89.29 CHRONIC BILATERAL LOW BACK PAIN WITHOUT SCIATICA: ICD-10-CM

## 2024-12-30 DIAGNOSIS — M54.50 CHRONIC BILATERAL LOW BACK PAIN WITHOUT SCIATICA: ICD-10-CM

## 2024-12-30 RX ORDER — HYDROCODONE BITARTRATE AND ACETAMINOPHEN 5; 325 MG/1; MG/1
1-2 TABLET ORAL EVERY 6 HOURS PRN
Qty: 10 TABLET | Refills: 0 | Status: SHIPPED | OUTPATIENT
Start: 2024-12-30

## 2024-12-30 NOTE — TELEPHONE ENCOUNTER
Pt calling in regards to refill Rx of Narco.    Pts insurance runs out tomorrow 12/31 and is trying to get prescription refilled prior to having no insurance.     Pt last seen on 12/18 for back pain and prescribed 10 tablets of NARCO for pain.     Willing to refill prescription?    Berenice Ybarra RN     Pt would like call back regarding refill of Rx.

## 2025-01-29 ENCOUNTER — TELEPHONE (OUTPATIENT)
Dept: FAMILY MEDICINE | Facility: CLINIC | Age: 50
End: 2025-01-29
Payer: COMMERCIAL

## 2025-01-29 DIAGNOSIS — G89.29 CHRONIC BILATERAL LOW BACK PAIN WITHOUT SCIATICA: ICD-10-CM

## 2025-01-29 DIAGNOSIS — M54.50 CHRONIC BILATERAL LOW BACK PAIN WITHOUT SCIATICA: ICD-10-CM

## 2025-01-29 RX ORDER — HYDROCODONE BITARTRATE AND ACETAMINOPHEN 5; 325 MG/1; MG/1
1-2 TABLET ORAL EVERY 6 HOURS PRN
Qty: 10 TABLET | Refills: 0 | OUTPATIENT
Start: 2025-01-29

## 2025-01-29 NOTE — TELEPHONE ENCOUNTER
Patient calling to let us know that the pharmacy closes at 6:00pm.  Patient is wanting someone to call him with an update as to when this will be sent to pharmacy.    CAROLINA Eason  [facility]

## 2025-01-29 NOTE — TELEPHONE ENCOUNTER
I have prescribed patient 20 tabs over the last 5 weeks. He needs to be seen by a provider to discuss back pain further before he can get any more refills.

## 2025-01-29 NOTE — TELEPHONE ENCOUNTER
"Patient calling for a second time, attempting to reach [facility].    Patient requesting his message to be sent to [place] provider he had last seen to further assist with request. Patient stating \"I am just looking for a reasonable doctor that can help me\".    RN once again emphasized that a visit is needed to further discuss back pain and medication management. Patient stating he will not book appointment as he does not have health insurance. Patient requesting message to be sent to [place] provider specifically, to further advise on request without appointment.     Emphasized to patient it is very unlikely that provider would prescribe narcotic for him without visit or assessment. Patient began yelling profane names at RN and proceeded to end call.    Routing to provider patient last seen at [facility] as requested to further advise as requested.    ANEL MenardN RN  [facility]  "

## 2025-01-29 NOTE — TELEPHONE ENCOUNTER
"Spoke with patient. Relayed provider's message as written. Patient verbalized understanding, however, expressing frustration.     Patient states that he is unemployed at this time, therefore, unable to afford another doctor's visit. He notes that he begins his new job in 3 weeks.     Advised to patient that RN able to place a care coordination referral to further discuss financial concerns and additional resources. Also advised another alternative is seeking reimbursement in the future from insurance company that he will be enrolled in.     Patient became frustrated with RN response, stating \"What am I supposed to do now? Just sit here and die?\".    RN attempted to de-escalate situation, patient unable to de-escalate. RN offered to transfer call to patient relations to further discuss concerns, as patient declining all alternative suggestions. Patient verbalized agreement and requested to be transferred.     CLAUDIA Menard RN  [facility]  "

## 2025-01-30 NOTE — TELEPHONE ENCOUNTER
I agree I would not refill a narcotic without an in person visit. Also I have not seen this patient in person since November of 2023. If this is long term narcotic use then I would have patient seen Pain clinic as they manage this anyway's.    Thank you,  Pamela Ruth PA-C

## 2025-01-30 NOTE — TELEPHONE ENCOUNTER
RN called and spoke with patient.    RN advised patient he would need to be seen in person and be evaluated to be prescribed pain medication.    RN explained provider needs to fully assess patients to prescribe appropriate treatment.    Patient verbalized understanding.    Desmond Espinosa RN, BSN, PHN  [facility]

## 2025-03-21 ENCOUNTER — OFFICE VISIT (OUTPATIENT)
Dept: OPTOMETRY | Facility: CLINIC | Age: 50
End: 2025-03-21
Payer: COMMERCIAL

## 2025-03-21 DIAGNOSIS — H52.4 PRESBYOPIA: ICD-10-CM

## 2025-03-21 DIAGNOSIS — H52.13 MYOPIA OF BOTH EYES: Primary | ICD-10-CM

## 2025-03-21 DIAGNOSIS — H52.221 REGULAR ASTIGMATISM OF RIGHT EYE: ICD-10-CM

## 2025-03-21 PROCEDURE — 99207 PR NO CHARGE LOS: CPT | Performed by: OPTOMETRIST

## 2025-03-21 ASSESSMENT — VISUAL ACUITY
OS_SC+: -2
OS_SC: 20/20
CORRECTION_TYPE: GLASSES
OS_SC: 20/80-1
METHOD: SNELLEN - LINEAR
OS_CC: 20/20
OD_CC: 20/25
OD_CC: 20/60+2
OD_SC: 20/50
OD_SC+: -2
OD_CC+: +2
OD_SC: 20/30+2
OS_CC: 20/40-1

## 2025-03-21 ASSESSMENT — CONF VISUAL FIELD
METHOD: COUNTING FINGERS
OS_INFERIOR_TEMPORAL_RESTRICTION: 0
OD_SUPERIOR_TEMPORAL_RESTRICTION: 0
OS_SUPERIOR_NASAL_RESTRICTION: 0
OS_SUPERIOR_TEMPORAL_RESTRICTION: 0
OD_INFERIOR_TEMPORAL_RESTRICTION: 0
OD_NORMAL: 1
OS_NORMAL: 1
OS_INFERIOR_NASAL_RESTRICTION: 0
OD_SUPERIOR_NASAL_RESTRICTION: 0
OD_INFERIOR_NASAL_RESTRICTION: 0

## 2025-03-21 ASSESSMENT — REFRACTION_MANIFEST
OD_CYLINDER: +1.00
OD_SPHERE: -1.25
OD_ADD: +1.75
OS_SPHERE: -0.25
OS_CYLINDER: SPHERE
OD_AXIS: 005
OS_ADD: +1.75

## 2025-03-21 ASSESSMENT — REFRACTION_WEARINGRX
OD_AXIS: 006
SPECS_TYPE: PAL
OS_ADD: 1.63
OD_SPHERE: -1.75
OD_ADD: 1.63
OS_SPHERE: -0.25
OD_CYLINDER: +1.50
OS_CYLINDER: SPHERE

## 2025-03-21 NOTE — PATIENT INSTRUCTIONS
Re-make the right lens with the prescription found today (lowered in the to reduce difference between the eyes).       Kehinde Allen OD  [address]  [place]  [address] [phone]

## 2025-03-21 NOTE — PROGRESS NOTES
Chief Complaint   Patient presents with    New Eval For Glasses    Blurred Vision Evaluation       [facility] picked [facility] ~1 month ago     How long have you tried the glasses: Has tried wearing on/off a few times but can't keep them on for longer than 15-20 mins - tried to wear them today with same result     What is not working with glasses: States he gets so nauseous he throws up when he wears them for more than a few minutes. Can't tell if the vision is bad or if it is the PAL making him sick but he can't wear them at all. Feels he can see much better without them at all distances     -has not talked to optical about Rx or had adjustment     Lisbeth Castro  Optometry Assistant      OBJECTIVE: See Ophthalmology exam    ASSESSMENT:    ICD-10-CM    1. Myopia of both eyes  H52.13       2. Regular astigmatism of right eye  H52.221       3. Presbyopia  H52.4         PLAN:  Patient Instructions   Re-make the right lens with the prescription found today (lowered in the to reduce difference between the eyes).       Kehinde Allen, OD  [facility]  [address] [place]  [address]    [phone]

## 2025-03-21 NOTE — LETTER
3/21/2025      Rigoberto Boss  [address] [facility] [address]      Dear Colleague,    Thank you for referring your patient, Rigoberto Boss, to the [facility]. Please see a copy of my visit note below.    Chief Complaint   Patient presents with     New Eval For Glasses     Blurred Vision Evaluation       Source of glasses: [facility] - picked up ~1 month ago     How long have you tried the glasses: Has tried wearing on/off a few times but can't keep them on for longer than 15-20 mins - tried to wear them today with same result     What is not working with glasses: States he gets so nauseous he throws up when he wears them for more than a few minutes. Can't tell if the vision is bad or if it is the PAL making him sick but he can't wear them at all. Feels he can see much better without them at all distances     -has not talked to optical about Rx or had adjustment     Lisbeth Castro  Optometry Assistant      OBJECTIVE: See Ophthalmology exam    ASSESSMENT:    ICD-10-CM    1. Myopia of both eyes  H52.13       2. Regular astigmatism of right eye  H52.221       3. Presbyopia  H52.4         PLAN:  Patient Instructions   Re-make the right lens with the prescription found today (lowered in the to reduce difference between the eyes).       Kehinde Allen OD  [facility]  [address] [place] [address]. [place]  [address]    [phone]         Again, thank you for allowing me to participate in the care of your patient.        Sincerely,        Kehinde Allen OD    Electronically signed

## 2025-04-16 ENCOUNTER — NURSE TRIAGE (OUTPATIENT)
Dept: SCHEDULING | Facility: CLINIC | Age: 50
End: 2025-04-16
Payer: COMMERCIAL

## 2025-04-16 NOTE — TELEPHONE ENCOUNTER
Order/Referral Request    Who is requesting: Reed    Orders being requested: PT current is      Reason service is needed/diagnosis:     Chronic left-sided low back pain without sciatica [M54.50, G89.29]   ( Also wondering if he can do left knee PT or if it would have to be separate orders)     When are orders needed by: asap    Has this been discussed with Provider: N/A    Does patient have a preference on a Group/Provider/Facility? [facility]  [place]    Does patient have an appointment scheduled?: No    Where to send orders: Place orders within Epic    Could we send this information to you in [facility] or would you prefer to receive a phone call?:   Patient would prefer a phone call   Okay to leave a detailed message?: Yes at Cell number on file:    Telephone Information:   Mobile [phone]

## 2025-04-16 NOTE — TELEPHONE ENCOUNTER
Called patient. Left voice message to return call at [phone].    When patient returns call, please further triage symptoms and advise that re-evaluation needed as patient has not been seen since 12/2024.Otherwise eVisit is also an option.    ANEL MenardN RN  [facility]

## 2025-04-16 NOTE — TELEPHONE ENCOUNTER
Patient last saw Janice DE LEON CNP on 12/18/2024 for Yearly Physical.  Routing to [facility].    Thank you kindly,  Desmond FUNK

## 2025-04-16 NOTE — TELEPHONE ENCOUNTER
Pt calling the clinic back regarding message left for him.     RN relayed that the PT referral had  and that pt would need to be reevaluated or do an evisit for a new one to be placed. RN reviewed pts symptoms below and scheduled pt with visit tomorrow at 8:40 with a provider at [place]. Pt agreeable.     Reason for Disposition   MODERATE back pain (e.g., interferes with normal activities) and present > 3 days    Additional Information   Negative: Passed out (e.g., fainted, lost consciousness, blacked out and was not responding)   Negative: Shock suspected (e.g., cold/pale/clammy skin, too weak to stand, low BP, rapid pulse)   Negative: Sounds like a life-threatening emergency to the triager   Negative: Major injury to the back (e.g., MVA, fall > 10 feet or 3 meters, penetrating injury, etc.)   Negative: Pain in the upper back over the ribs (rib cage) that radiates (travels) into the chest   Negative: Pain in the upper back over the ribs (rib cage) and worsened by coughing (or clearly increases with breathing)   Negative: Back pain during pregnancy   Negative: SEVERE back pain of sudden onset and age > 60 years   Negative: SEVERE abdominal pain (e.g., excruciating)   Negative: Abdominal pain and age > 60 years   Negative: Unable to urinate (or only a few drops) and bladder feels very full   Negative: Loss of bladder or bowel control (urine or bowel incontinence; wetting self, leaking stool) of new-onset   Negative: Numbness (loss of sensation) in groin or rectal area   Negative: Pain radiates into groin, scrotum   Negative: Blood in urine (red, pink, or tea-colored)   Negative: Vomiting and pain over lower ribs of back (i.e., flank - kidney area)   Negative: Weakness of a leg or foot (e.g., unable to bear weight, dragging foot)   Negative: Patient sounds very sick or weak to the triager   Negative: Fever > 100.4 F (38.0 C) and flank pain   Negative: Pain or burning with passing urine (urination)   Negative:  "SEVERE back pain (e.g., excruciating, unable to do any normal activities) and not improved after pain medicine and CARE ADVICE   Negative: Numbness in an arm or hand (i.e., loss of sensation) and upper back pain   Negative: Numbness in a leg or foot (i.e., loss of sensation)   Negative: High-risk adult (e.g., history of cancer, history of HIV, or history of IV Drug Use)   Negative: Soft tissue infection (e.g., abscess, cellulitis) or other serious infection (e.g., bacteremia) in last 2 weeks   Negative: Painful rash with multiple small blisters grouped together (i.e., dermatomal distribution or 'band' or 'stripe')   Negative: Pain radiates into the thigh or further down the leg, and in both legs   Negative: Age > 50 and no history of prior similar back pain    Answer Assessment - Initial Assessment Questions  1. ONSET: \"When did the pain begin?\" (e.g., minutes, hours, days)      Yesterday 4/15  2. LOCATION: \"Where does it hurt?\" (upper, mid or lower back)      Lower back  3. SEVERITY: \"How bad is the pain?\"  (e.g., Scale 1-10; mild, moderate, or severe)      3-8/10 depending on activity  4. PATTERN: \"Is the pain constant?\" (e.g., yes, no; constant, intermittent)       Constant gets worse depending on what he is doing. Walking the pain is better. Laying down the pain starts to become shooting pain.   5. RADIATION: \"Does the pain shoot into your legs or somewhere else?\"      Up into back, same as before when he has thrown back out.  6. CAUSE:  \"What do you think is causing the back pain?\"       Throwing back out, has done previously, previously has just used his exercises but this time he felt like he needs Pt and would like to see a provider.  7. BACK OVERUSE:  \"Any recent lifting of heavy objects, strenuous work or exercise?\"      Sneezed and threw back out  8. MEDICINES: \"What have you taken so far for the pain?\" (e.g., nothing, acetaminophen, NSAIDS)      Advil, pt stated would like something stronger.   9. " "NEUROLOGIC SYMPTOMS: \"Do you have any weakness, numbness, or problems with bowel/bladder control?\"      Denies weakness, numbness, or loss of bowel or bladder control.  10. OTHER SYMPTOMS: \"Do you have any other symptoms?\" (e.g., fever, abdomen pain, burning with urination, blood in urine)        Denies fever, abdominal pain, burning with urination or blood in the urine.   11. PREGNANCY: \"Is there any chance you are pregnant?\" \"When was your last menstrual period?\"        N/A    Protocols used: Back Pain-A-OH    Berenice Ybarra RN    "

## 2025-04-17 ENCOUNTER — TELEPHONE (OUTPATIENT)
Dept: FAMILY MEDICINE | Facility: CLINIC | Age: 50
End: 2025-04-17

## 2025-04-17 ENCOUNTER — OFFICE VISIT (OUTPATIENT)
Dept: FAMILY MEDICINE | Facility: CLINIC | Age: 50
End: 2025-04-17
Payer: COMMERCIAL

## 2025-04-17 VITALS
OXYGEN SATURATION: 96 % | WEIGHT: 315 LBS | HEART RATE: 116 BPM | TEMPERATURE: 97.2 F | SYSTOLIC BLOOD PRESSURE: 135 MMHG | RESPIRATION RATE: 18 BRPM | HEIGHT: 71 IN | DIASTOLIC BLOOD PRESSURE: 83 MMHG | BODY MASS INDEX: 44.1 KG/M2

## 2025-04-17 DIAGNOSIS — M25.561 CHRONIC PAIN OF RIGHT KNEE: ICD-10-CM

## 2025-04-17 DIAGNOSIS — G89.29 CHRONIC PAIN OF RIGHT KNEE: ICD-10-CM

## 2025-04-17 DIAGNOSIS — G89.29 CHRONIC BILATERAL LOW BACK PAIN WITHOUT SCIATICA: Primary | ICD-10-CM

## 2025-04-17 DIAGNOSIS — M54.50 CHRONIC BILATERAL LOW BACK PAIN WITHOUT SCIATICA: Primary | ICD-10-CM

## 2025-04-17 DIAGNOSIS — S83.206D TEAR OF RIGHT MENISCUS AS CURRENT INJURY, SUBSEQUENT ENCOUNTER: ICD-10-CM

## 2025-04-17 RX ORDER — HYDROCODONE BITARTRATE AND ACETAMINOPHEN 5; 325 MG/1; MG/1
1-2 TABLET ORAL EVERY 6 HOURS PRN
Qty: 10 TABLET | Refills: 0 | Status: SHIPPED | OUTPATIENT
Start: 2025-04-17

## 2025-04-17 RX ORDER — TIZANIDINE 2 MG/1
2-4 TABLET ORAL 3 TIMES DAILY PRN
Qty: 30 TABLET | Refills: 2 | Status: SHIPPED | OUTPATIENT
Start: 2025-04-17

## 2025-04-17 ASSESSMENT — ENCOUNTER SYMPTOMS: BACK PAIN: 1

## 2025-04-17 NOTE — TELEPHONE ENCOUNTER
Routing to Dr. Gutierrez    Patient calling - he was seen for OV today.    Dr. Gutierrez prescribed hydrocodone-acetaminophen and tizanidine today.    The pharmacy told the patient that this can interact with his alprazolam (which he takes it only occasionally for his anxiety). Pharmacy needs a verbal ok to make sure provider is ok with this drug to drug interaction.    Patient said he only takes the alprazolam occasionally and will not take it when he is on these two new meds prescribed. He is at pharmacy right now and is hoping to get it today before he goes to work. RN said I can send it high priority, but can't guarantee that it will get addressed ASAP as provider is in appointments all day.     CLAUDIA Hastings  [place] Triage RN  [facility]

## 2025-04-17 NOTE — TELEPHONE ENCOUNTER
Notified pharmacy staff and verbalized understanding. Will dispense to patient.    ANEL SevillaN ABNER  [facility]

## 2025-04-17 NOTE — PROGRESS NOTES
"  Assessment & Plan       ICD-10-CM    1. Chronic bilateral low back pain without sciatica  M54.50 Physical Therapy  Referral    G89.29 HYDROcodone-acetaminophen (NORCO) 5-325 MG tablet     tiZANidine (ZANAFLEX) 2 MG tablet      2. Tear of right meniscus as current injury, subsequent encounter  S83.206D Physical Therapy  Referral      3. Chronic pain of right knee  M25.561 Physical Therapy  Referral    G89.29               The longitudinal plan of care for the diagnosis(es)/condition(s) as documented were addressed during this visit. Due to the added complexity in care, I will continue to support Rigoberto in the subsequent management and with ongoing continuity of care.     BMI  Estimated body mass index is 48.95 kg/m  as calculated from the following:    Height as of this encounter: 1.803 m (5' 11\").    Weight as of this encounter: 159.2 kg (351 lb).   Weight management plan: Discussed healthy diet and exercise guidelines          Subjective   Rigoberto is a 50 year old, presenting for the following health issues:  Back Pain        4/17/2025     8:59 AM   Additional Questions   Roomed by Kat   Accompanied by none         4/17/2025     8:59 AM   Patient Reported Additional Medications   Patient reports taking the following new medications none     History of Present Illness       Reason for visit:  Back and knee pain    He eats 0-1 servings of fruits and vegetables daily.He consumes 0 sweetened beverage(s) daily.He exercises with enough effort to increase his heart rate 9 or less minutes per day.  He exercises with enough effort to increase his heart rate 3 or less days per week. He is missing 1 dose(s) of medications per week.      Chronic bilateral lower back pain  Worse in the morning  MRI shows disc herniation  Home exercises have not helped much, requests PT referral    Right knee pain  Chronic   Meniscus tear  TCO - non-op  Requests PT for this             Review of " "Systems  Constitutional, HEENT, cardiovascular, pulmonary, gi and gu systems are negative, except as otherwise noted.      Objective    /83   Pulse 116   Temp 97.2  F (36.2  C) (Temporal)   Resp 18   Ht 1.803 m (5' 11\")   Wt (!) 159.2 kg (351 lb)   SpO2 96%   BMI 48.95 kg/m    Body mass index is 48.95 kg/m .  Physical Exam  Constitutional:       General: He is not in acute distress.     Appearance: Normal appearance. He is well-developed. He is not ill-appearing.   HENT:      Head: Normocephalic and atraumatic.      Right Ear: External ear normal.      Left Ear: External ear normal.      Nose: Nose normal.   Eyes:      General: No scleral icterus.     Extraocular Movements: Extraocular movements intact.      Conjunctiva/sclera: Conjunctivae normal.   Cardiovascular:      Rate and Rhythm: Normal rate.   Pulmonary:      Effort: Pulmonary effort is normal.   Musculoskeletal:      Cervical back: Normal range of motion and neck supple.   Skin:     General: Skin is warm and dry.   Neurological:      Mental Status: He is alert and oriented to person, place, and time.   Psychiatric:         Behavior: Behavior normal.         Thought Content: Thought content normal.         Judgment: Judgment normal.                    Signed Electronically by: Nelson Woo MD    "

## 2025-04-29 ENCOUNTER — THERAPY VISIT (OUTPATIENT)
Dept: PHYSICAL THERAPY | Facility: CLINIC | Age: 50
End: 2025-04-29
Attending: FAMILY MEDICINE
Payer: COMMERCIAL

## 2025-04-29 DIAGNOSIS — G89.29 CHRONIC PAIN OF RIGHT KNEE: ICD-10-CM

## 2025-04-29 DIAGNOSIS — M25.561 CHRONIC PAIN OF RIGHT KNEE: ICD-10-CM

## 2025-04-29 DIAGNOSIS — G89.29 CHRONIC BILATERAL LOW BACK PAIN WITHOUT SCIATICA: ICD-10-CM

## 2025-04-29 DIAGNOSIS — M54.50 CHRONIC BILATERAL LOW BACK PAIN WITHOUT SCIATICA: ICD-10-CM

## 2025-04-29 DIAGNOSIS — S83.206D TEAR OF RIGHT MENISCUS AS CURRENT INJURY, SUBSEQUENT ENCOUNTER: ICD-10-CM

## 2025-04-29 PROCEDURE — 97162 PT EVAL MOD COMPLEX 30 MIN: CPT | Mod: GP | Performed by: PHYSICAL THERAPIST

## 2025-04-29 PROCEDURE — 97110 THERAPEUTIC EXERCISES: CPT | Mod: GP | Performed by: PHYSICAL THERAPIST

## 2025-04-29 NOTE — PROGRESS NOTES
PHYSICAL THERAPY EVALUATION  Type of Visit: Evaluation       Fall Risk Screen:  Have you fallen 2 or more times in the past year?: No  Have you fallen and had an injury in the past year?: No    Subjective         Presenting condition or subjective complaint: Pt has a chronic/recurrent L low back injury, flares up 3-4x/Per year, lasts a few days to a few weeks typically, PL can reach 8/10 in. Episodes usually occur after sneezing, and reaching in for laundry, getting out of bed.  Pt usually recovers with rest/ice/time.  Pt is not currently exercising, due to the back and knee pain.  Pt would like to return to [employer].  Pt does not have an active low back issue right now.  R knee started hurting 3 years ago, playing pickleball, had an xray which showed a partially torn meniscus, pain comes and goes, swimming seems to irritate it.  Date of onset: 04/17/25    Relevant medical history: Depression; Migraines or headaches; Overweight   Dates & types of surgery:      Prior diagnostic imaging/testing results: MRI; X-ray     Prior therapy history for the same diagnosis, illness or injury: No      Prior Level of Function  Transfers:   Ambulation:   ADL:   IADL:     Living Environment  Social support: With a significant other or spouse   Type of home: House   Stairs to enter the home: Yes 15 Is there a railing: Yes     Ramp: No   Stairs inside the home: Yes 15 Is there a railing: Yes     Help at home: None  Equipment owned:       Employment: Yes   Hobbies/Interests: [employer]    Patient goals for therapy: be more active       Objective   LUMBAR SPINE EVALUATION  PAIN: Pain Level at Rest: 0/10  Pain Level with Use: 8/10  Pain Location: lumbar spine and knee  Pain Quality: Aching, Sharp, Shooting, Stabbing, and Tender  Pain Frequency: intermittent  Pain is Worst: daytime  Pain is Exacerbated By: standing up, walking, stairs  Pain is Relieved By: rest  Pain Progression: Worsened  INTEGUMENTARY (edema,  incisions):   POSTURE:   GAIT:   Weightbearing Status:   Assistive Device(s):   Gait Deviations: Antalgic  Stride length decreased  Cari decreased  BALANCE/PROPRIOCEPTION:   WEIGHTBEARING ALIGNMENT:   NON-WEIGHTBEARING ALIGNMENT:    ROM:   (Degrees) Left AROM Left PROM  Right AROM Right PROM   Hip Flexion       Hip Extension       Hip Abduction       Hip Adduction       Hip Internal Rotation       Hip External Rotation       Knee Flexion       Knee Extension       Lumbar Side glide Min loss Min loss   Lumbar Flexion Mod loss erp   Lumbar Extension Min loss   Pain:   End feel:   PELVIC/SI SCREEN:   STRENGTH:  core strength: fair.  MMT: glute med R 4/5, L 4+/5, glute max R 4/5, L 4+/5.  Quad 5-/5 B    MYOTOMES:   DTR S:   CORD SIGNS:   DERMATOMES:   NEURAL TENSION:   FLEXIBILITY:  tight hamstrings, quads, hip flexors  LUMBAR/HIP Special Tests:    SPECIAL TESTS: + patellar compression on R  FUNCTIONAL TESTS:   PALPATION:   SPINAL SEGMENTAL CONCLUSIONS:       Assessment & Plan   CLINICAL IMPRESSIONS  Medical Diagnosis: hronic bilateral low back pain without sciatica  Tear of right meniscus as current injury, subsequent encounter  Chronic pain of right knee    Treatment Diagnosis: low back/R knee pain   Impression/Assessment: Patient is a 50 year old male with L low back and R knee complaints.  The following significant findings have been identified: Pain, Decreased ROM/flexibility, Decreased strength, Inflammation, Impaired gait, Impaired muscle performance, Decreased activity tolerance, and Instability. These impairments interfere with their ability to perform self care tasks, work tasks, recreational activities, household chores, driving , household mobility, and community mobility as compared to previous level of function.     Clinical Decision Making (Complexity):  Clinical Presentation: Evolving/Changing  Clinical Presentation Rationale: based on medical and personal factors listed in PT evaluation  Clinical  Decision Making (Complexity): Moderate complexity    PLAN OF CARE  Treatment Interventions:  Interventions: Gait Training, Manual Therapy, Neuromuscular Re-education, Therapeutic Activity, Therapeutic Exercise, Self-Care/Home Management    Long Term Goals     PT Goal 1  Goal Identifier: walking  Goal Description: Pt will be able to walk 2 miles pain free  Rationale: to maximize safety and independence with performance of ADLs and functional tasks  Target Date: 06/24/25      Frequency of Treatment: 1x/week  Duration of Treatment: 8 weeks    Recommended Referrals to Other Professionals:   Education Assessment:   Learner/Method: Patient;Listening;Reading;Demonstration;Pictures/Video  Education Comments: Pt instructed on findings of evaluation, expectations regarding frequency/intensity of HEP.    Risks and benefits of evaluation/treatment have been explained.   Patient/Family/caregiver agrees with Plan of Care.     Evaluation Time:     PT Eval, Moderate Complexity Minutes (40557): 15     Signing Clinician: Rigoberto Hilton PT

## 2025-06-30 ENCOUNTER — VIRTUAL VISIT (OUTPATIENT)
Dept: FAMILY MEDICINE | Facility: CLINIC | Age: 50
End: 2025-06-30
Payer: COMMERCIAL

## 2025-06-30 DIAGNOSIS — F10.21 ALCOHOL DEPENDENCE IN REMISSION (H): Primary | ICD-10-CM

## 2025-06-30 DIAGNOSIS — R41.840 DIFFICULTY CONCENTRATING: ICD-10-CM

## 2025-06-30 PROCEDURE — 98005 SYNCH AUDIO-VIDEO EST LOW 20: CPT

## 2025-06-30 RX ORDER — VILAZODONE HYDROCHLORIDE 10 MG/1
1 TABLET ORAL DAILY
COMMUNITY
Start: 2025-06-10

## 2025-06-30 NOTE — LETTER
June 30, 2025      Rigoberto Boss  [address] [facility] [facility] [address]        To Whom It May Concern:    Rigoberto Boss  was seen on 6/30.  Please excuse him from work June 4th to July 6th due to illness. Patient may return back to work July 7th.        Sincerely,        HALEY Trinh CNP    Electronically signed

## 2025-06-30 NOTE — PROGRESS NOTES
Reed is a 50 year old who is being evaluated via a billable video visit.    How would you like to obtain your AVS? MyChart  If the video visit is dropped, the invitation should be resent by: Text to cell phone: [phone]  Will anyone else be joining your video visit? No      Assessment & Plan     Alcohol dependence in remission (H)  Currently in treatment at [facility], completed note for patient to be excused from work while at treatment. Patient feels up to going back to work and will be going back July 7th.     Difficulty concentrating  Would like to complete ADHD testing as he often has difficulty concentrating. Referral placed.   - Adult Mental Health  Referral; Future    The longitudinal plan of care for the diagnosis(es)/condition(s) as documented were addressed during this visit. Due to the added complexity in care, I will continue to support Reed in the subsequent management and with ongoing continuity of care.    Subjective   Reed is a 50 year old, presenting for the following health issues:  Attention Deficit Disorder (testing) and Forms (Letter to go back to work)      6/30/2025    11:38 AM   Additional Questions   Roomed by jd alejandra         6/30/2025   Forms   Any forms needing to be completed Yes     History of Present Illness       Reason for visit:  Return to work.   He is taking medications regularly.     Pt would like a letter to go back to work.  Pt is currently in treatment for alcohol use disorder.     Pt would like to be tested for ADD.     Will be leaving treatment tomorrow  Arrived June 4th leaving July 1st and going back to work July 7th.         Objective           Vitals:  No vitals were obtained today due to virtual visit.    Physical Exam   GENERAL: alert and no distress  RESP: No audible wheeze, cough, or visible cyanosis.    SKIN: Visible skin clear. No significant rash, abnormal pigmentation or lesions.  NEURO: Cranial nerves grossly intact.  Mentation and speech  appropriate for age.  PSYCH: Appropriate affect, tone, and pace of words          Video-Visit Details    Type of service:  Video Visit   Originating Location (pt. Location): Home    Distant Location (provider location):  On-site  Platform used for Video Visit: Matthew  Signed Electronically by: HALEY Trinh CNP

## 2025-07-01 ENCOUNTER — PATIENT OUTREACH (OUTPATIENT)
Dept: CARE COORDINATION | Facility: CLINIC | Age: 50
End: 2025-07-01
Payer: COMMERCIAL

## 2025-07-03 ENCOUNTER — PATIENT OUTREACH (OUTPATIENT)
Dept: CARE COORDINATION | Facility: CLINIC | Age: 50
End: 2025-07-03
Payer: COMMERCIAL

## 2025-07-30 PROBLEM — M25.561 CHRONIC PAIN OF RIGHT KNEE: Status: RESOLVED | Noted: 2025-04-29 | Resolved: 2025-07-30

## 2025-07-30 PROBLEM — G89.29 CHRONIC PAIN OF RIGHT KNEE: Status: RESOLVED | Noted: 2025-04-29 | Resolved: 2025-07-30

## 2025-09-04 ENCOUNTER — OFFICE VISIT (OUTPATIENT)
Dept: INTERNAL MEDICINE | Facility: CLINIC | Age: 50
End: 2025-09-04
Payer: COMMERCIAL

## 2025-09-04 VITALS
HEART RATE: 77 BPM | WEIGHT: 315 LBS | DIASTOLIC BLOOD PRESSURE: 81 MMHG | SYSTOLIC BLOOD PRESSURE: 118 MMHG | TEMPERATURE: 97.4 F | RESPIRATION RATE: 18 BRPM | BODY MASS INDEX: 44.1 KG/M2 | HEIGHT: 71 IN | OXYGEN SATURATION: 96 %

## 2025-09-04 DIAGNOSIS — M17.0 PRIMARY OSTEOARTHRITIS OF BOTH KNEES: ICD-10-CM

## 2025-09-04 DIAGNOSIS — M23.203 OLD TEAR OF MEDIAL MENISCUS OF RIGHT KNEE, UNSPECIFIED TEAR TYPE: Primary | ICD-10-CM

## 2025-09-04 DIAGNOSIS — R07.89 CHEST WALL PAIN: ICD-10-CM

## 2025-09-04 DIAGNOSIS — E66.01 MORBID OBESITY (H): ICD-10-CM

## 2025-09-04 DIAGNOSIS — D3A.012 BENIGN CARCINOID TUMOR OF ILEUM (H): ICD-10-CM

## 2025-09-04 ASSESSMENT — ANXIETY QUESTIONNAIRES
8. IF YOU CHECKED OFF ANY PROBLEMS, HOW DIFFICULT HAVE THESE MADE IT FOR YOU TO DO YOUR WORK, TAKE CARE OF THINGS AT HOME, OR GET ALONG WITH OTHER PEOPLE?: EXTREMELY DIFFICULT
6. BECOMING EASILY ANNOYED OR IRRITABLE: SEVERAL DAYS
GAD7 TOTAL SCORE: 15
7. FEELING AFRAID AS IF SOMETHING AWFUL MIGHT HAPPEN: MORE THAN HALF THE DAYS
4. TROUBLE RELAXING: NEARLY EVERY DAY
2. NOT BEING ABLE TO STOP OR CONTROL WORRYING: NEARLY EVERY DAY
3. WORRYING TOO MUCH ABOUT DIFFERENT THINGS: NEARLY EVERY DAY
1. FEELING NERVOUS, ANXIOUS, OR ON EDGE: NEARLY EVERY DAY
5. BEING SO RESTLESS THAT IT IS HARD TO SIT STILL: NOT AT ALL
7. FEELING AFRAID AS IF SOMETHING AWFUL MIGHT HAPPEN: MORE THAN HALF THE DAYS
GAD7 TOTAL SCORE: 15
GAD7 TOTAL SCORE: 15
IF YOU CHECKED OFF ANY PROBLEMS ON THIS QUESTIONNAIRE, HOW DIFFICULT HAVE THESE PROBLEMS MADE IT FOR YOU TO DO YOUR WORK, TAKE CARE OF THINGS AT HOME, OR GET ALONG WITH OTHER PEOPLE: EXTREMELY DIFFICULT

## (undated) RX ORDER — FENTANYL CITRATE 50 UG/ML
INJECTION, SOLUTION INTRAMUSCULAR; INTRAVENOUS
Status: DISPENSED
Start: 2024-03-01